# Patient Record
Sex: MALE | Race: WHITE | NOT HISPANIC OR LATINO | Employment: FULL TIME | URBAN - NONMETROPOLITAN AREA
[De-identification: names, ages, dates, MRNs, and addresses within clinical notes are randomized per-mention and may not be internally consistent; named-entity substitution may affect disease eponyms.]

---

## 2017-11-30 ENCOUNTER — HISTORY (OUTPATIENT)
Dept: FAMILY MEDICINE | Facility: OTHER | Age: 42
End: 2017-11-30

## 2017-11-30 ENCOUNTER — OFFICE VISIT - GICH (OUTPATIENT)
Dept: FAMILY MEDICINE | Facility: OTHER | Age: 42
End: 2017-11-30

## 2017-11-30 DIAGNOSIS — Z00.00 ENCOUNTER FOR GENERAL ADULT MEDICAL EXAMINATION WITHOUT ABNORMAL FINDINGS: ICD-10-CM

## 2017-11-30 LAB
CHOL/HDL RATIO - HISTORICAL: 4.95
CHOLESTEROL TOTAL: 183 MG/DL
GLUCOSE SERPL-MCNC: 106 MG/DL (ref 70–105)
HDLC SERPL-MCNC: 37 MG/DL (ref 23–92)
LDLC SERPL CALC-MCNC: 133 MG/DL
NON-HDL CHOLESTEROL - HISTORICAL: 146 MG/DL
PROVIDER ORDERDED STATUS - HISTORICAL: ABNORMAL
TRIGL SERPL-MCNC: 67 MG/DL

## 2017-12-28 NOTE — PROGRESS NOTES
Patient Information     Patient Name MRN Sex Ismael Collins [id number] Male 1975      Progress Notes by Ismael Cruz MD at 2017  3:43 PM     Author:  Ismael Cruz MD Service:  (none) Author Type:  Physician     Filed:  2017 11:45 AM Encounter Date:  2017 Status:  Signed     :  Ismael Cruz MD (Physician)              SUBJECTIVE:    Ismael Maier is a 42 y.o. male who presents for px    HPI: No significant concerns at all.  Intends to stop tobacco.    PROBLEM LIST:  Patient Active Problem List     Diagnosis  Code     KNEE PAIN, RIGHT M25.569     IMPACTED CERUMEN H61.20     TICK-BORNE FEVER A93.8     PAST MEDICAL HISTORY:No past medical history on file.  SURGICAL HISTORY:  Past Surgical History:      Procedure  Laterality Date     NO PREVIOUS SURGERY         SOCIAL HISTORY:  Social History     Social History        Marital status:       Spouse name: N/A     Number of children:  N/A     Years of education:  N/A     Occupational History        [employer]     Social History Main Topics        Smoking status:  Never Smoker     Smokeless tobacco:  Current User     Types: Chew     Alcohol use  No     Drug use:  No     Sexual activity:  Not on file     Other Topics  Concern     Not on file      Social History Narrative     .  Two children.  Works as a  for [employer].     FAMILY HISTORY:No family history on file.   CURRENT MEDICATIONS:   No current outpatient prescriptions on file.     No current facility-administered medications for this visit.      Medications have been reviewed by me and are current to the best of my knowledge and ability.    ALLERGIES:  Review of patient's allergies indicates no known allergies.    REVIEW OF SYSTEMS:  General: denies any general problems.  Eyes: denies problems  Ears/Nose/Throat: denies problems  Cardiovascular: denies problems  Respiratory: denies problems  Gastrointestinal: denies problems  Genitourinary: denies  problems  Musculoskeletal: denies problems  Skin: right cheek nodule for over 5 years, has been told it is a cyst  Neurologic: denies problems  Psychiatric: denies problems  Endocrine: denies problems  Heme/Lymphatic: denies problems  Allergic/Immunologic: denies problems  PHQ Depression Screening 11/30/2017   Date of PHQ exam (doc flow) 11/30/2017   1. Lack of interest/pleasure 0 - Not at all   2. Feeling down/depressed 0 - Not at all   PHQ-2 TOTAL SCORE 0   Some recent data might be hidden       OBJECTIVE:  /64  Pulse 62  Resp 16  Ht 1.829 m (6')  Wt 94.5 kg (208 lb 6.4 oz)  BMI 28.26 kg/m2  EXAM:   General Appearance: Pleasant, alert, appropriate appearance for age. No acute distress  Head Exam: Normal. Normocephalic, atraumatic.  Eye Exam:  Normal external eye, conjunctiva, lids, cornea. AISSATOU.  Ear Exam: Normal TM's bilaterally. Normal auditory canals and external ears. Non-tender.  Nose Exam: Normal external nose, mucus membranes, and septum.  OroPharynx Exam:  Dental hygiene adequate. Normal buccal mucosa. Normal pharynx.  Neck Exam:  Supple, no masses or nodes.  Thyroid Exam: No nodules or enlargement.  Chest/Respiratory Exam: Normal chest wall and respirations. Clear to auscultation.  Breast Exam: No dimpling, nipple retraction or discharge. No masses or nodes.  Cardiovascular Exam: Regular rate and rhythm. S1, S2, no murmur, click, gallop, or rubs.  Gastrointestinal Exam: Soft, non-tender, no masses or organomegaly.  Genitourinary Exam Male: Normal male genitalia. No discharge or penile ulcerations. No testicular masses or swelling.  Lymphatic Exam: Non-palpable nodes in neck, clavicular, axillary, or inguinal regions.  Musculoskeletal Exam: Back is straight and non-tender, full ROM of upper and lower extremities.  Skin: right cheek with a 1.5 cm nodule, consistent with a sebaceous cyst  Neurologic Exam: Nonfocal, symmetric DTRs, normal gross motor, tone coordination and no tremor.  Psychiatric  Exam: Alert and oriented - appropriate affect.    ASSESSMENT/PLAN:    ICD-10-CM    1. Routine medical exam Z00.00 LIPID PANEL      GLUCOSE, FASTING     BP Readings from Last 1 Encounters:11/30/17 : 124/64  Mr. Austin blood pressure is out of the normal range for adults. Per JNC-8 guidelines normal adult blood pressure is < 120/80, pre-hypertensive is between 120/80 and 139/89, and hypertension is 140/90 or greater. Risks of hypertension were discussed. Patient's strategy will be to recheck blood pressure in 12 months    Can come back for procedure appointment for cyst removal.    Ismael Cruz MD ....................  11/30/2017   3:56 PM

## 2017-12-30 NOTE — NURSING NOTE
Patient Information     Patient Name MRN Sex Ismael West [id number] Male 1975      Nursing Note by Day Fraser at 2017  3:30 PM     Author:  Day Fraser Service:  (none) Author Type:  (none)     Filed:  2017  3:42 PM Encounter Date:  2017 Status:  Signed     :  Day Fraser            Coming in for a physical for health care screening, check lump on the check  Day Fraser ....................  2017   3:30 PM

## 2018-01-26 VITALS
DIASTOLIC BLOOD PRESSURE: 64 MMHG | RESPIRATION RATE: 16 BRPM | SYSTOLIC BLOOD PRESSURE: 124 MMHG | HEART RATE: 62 BPM | BODY MASS INDEX: 28.23 KG/M2 | HEIGHT: 72 IN | WEIGHT: 208.4 LBS

## 2018-01-31 ENCOUNTER — DOCUMENTATION ONLY (OUTPATIENT)
Dept: FAMILY MEDICINE | Facility: OTHER | Age: 43
End: 2018-01-31

## 2018-07-23 NOTE — PROGRESS NOTES
Patient Information     Patient Name  Ismael Maier MRN  [id number] Sex  Male   1975      Letter by Ismael Cruz MD at      Author:  Ismael Cruz MD Service:  (none) Author Type:  (none)    Filed:   Encounter Date:  2017 Status:  (Other)           Ismael Maier  [address]  [place] [address]          2017    Dear Mr. Maier:    Following are the tests completed during your last clinic visit.  The results of these tests are normal and require no further attention unless otherwise noted.    Results for orders placed or performed in visit on 17      LIPID PANEL      Result  Value Ref Range    CHOLESTEROL,TOTAL 183 <200 mg/dL    TRIGLYCERIDES 67 <150 mg/dL    HDL CHOLESTEROL 37 23 - 92 mg/dL    NON-HDL CHOLESTEROL 146 (H) <145 mg/dl    CHOL/HDL RATIO            4.95 (H) <4.50                    LDL CHOLESTEROL 133 (H) <100 mg/dL    PROVIDER ORDERED STATUS RANDOM    GLUCOSE, FASTING      Result  Value Ref Range    GLUCOSE 106 (H) 70 - 105 mg/dL         If you have any further questions or problems contact my office at  [phone].    Thank you,    Ismael Cruz MD

## 2018-11-02 ENCOUNTER — OFFICE VISIT (OUTPATIENT)
Dept: FAMILY MEDICINE | Facility: OTHER | Age: 43
End: 2018-11-02
Attending: FAMILY MEDICINE
Payer: COMMERCIAL

## 2018-11-02 VITALS
HEIGHT: 72 IN | BODY MASS INDEX: 28.53 KG/M2 | WEIGHT: 210.6 LBS | RESPIRATION RATE: 14 BRPM | SYSTOLIC BLOOD PRESSURE: 126 MMHG | DIASTOLIC BLOOD PRESSURE: 64 MMHG | HEART RATE: 62 BPM

## 2018-11-02 DIAGNOSIS — L72.3 SEBACEOUS CYST: ICD-10-CM

## 2018-11-02 DIAGNOSIS — Z00.00 ROUTINE GENERAL MEDICAL EXAMINATION AT A HEALTH CARE FACILITY: Primary | ICD-10-CM

## 2018-11-02 DIAGNOSIS — Z13.220 SCREENING FOR LIPID DISORDERS: ICD-10-CM

## 2018-11-02 LAB
CHOLEST SERPL-MCNC: 182 MG/DL
GLUCOSE SERPL-MCNC: 88 MG/DL (ref 70–105)
HDLC SERPL-MCNC: 33 MG/DL (ref 23–92)
LDLC SERPL CALC-MCNC: 136 MG/DL
NONHDLC SERPL-MCNC: 149 MG/DL
TRIGL SERPL-MCNC: 65 MG/DL

## 2018-11-02 PROCEDURE — 36415 COLL VENOUS BLD VENIPUNCTURE: CPT | Performed by: FAMILY MEDICINE

## 2018-11-02 PROCEDURE — 99396 PREV VISIT EST AGE 40-64: CPT | Performed by: FAMILY MEDICINE

## 2018-11-02 PROCEDURE — 82947 ASSAY GLUCOSE BLOOD QUANT: CPT | Performed by: FAMILY MEDICINE

## 2018-11-02 PROCEDURE — 80061 LIPID PANEL: CPT | Performed by: FAMILY MEDICINE

## 2018-11-02 ASSESSMENT — ANXIETY QUESTIONNAIRES
3. WORRYING TOO MUCH ABOUT DIFFERENT THINGS: NOT AT ALL
1. FEELING NERVOUS, ANXIOUS, OR ON EDGE: NOT AT ALL
6. BECOMING EASILY ANNOYED OR IRRITABLE: NOT AT ALL
7. FEELING AFRAID AS IF SOMETHING AWFUL MIGHT HAPPEN: NOT AT ALL
5. BEING SO RESTLESS THAT IT IS HARD TO SIT STILL: NOT AT ALL
IF YOU CHECKED OFF ANY PROBLEMS ON THIS QUESTIONNAIRE, HOW DIFFICULT HAVE THESE PROBLEMS MADE IT FOR YOU TO DO YOUR WORK, TAKE CARE OF THINGS AT HOME, OR GET ALONG WITH OTHER PEOPLE: NOT DIFFICULT AT ALL
2. NOT BEING ABLE TO STOP OR CONTROL WORRYING: NOT AT ALL
GAD7 TOTAL SCORE: 0

## 2018-11-02 ASSESSMENT — PAIN SCALES - GENERAL: PAINLEVEL: NO PAIN (0)

## 2018-11-02 ASSESSMENT — PATIENT HEALTH QUESTIONNAIRE - PHQ9: 5. POOR APPETITE OR OVEREATING: NOT AT ALL

## 2018-11-02 NOTE — LETTER
November 2, 2018      Ismael Maier  [address]  [place] [address]        Dear Ismael,     This is all good.    Results for orders placed or performed in visit on 11/02/18   Lipid Panel   Result Value Ref Range    Cholesterol 182 <200 mg/dL    Triglycerides 65 <150 mg/dL    HDL Cholesterol 33 23 - 92 mg/dL    LDL Cholesterol Calculated 136 (H) <100 mg/dL    Non HDL Cholesterol 149 (H) <130 mg/dL   Glucose   Result Value Ref Range    Glucose 88 70 - 105 mg/dL           Sincerely,        Ismael Cruz MD

## 2018-11-02 NOTE — PROGRESS NOTES
SUBJECTIVE:   CC: Ismael Maier is an 42 year old male who presents for preventative health visit.   Has a small lump on the side of his right cheek that he wants to get checked out again. Has had this checked in the past, somewhat more bothersome with shaving.     Healthy Habits:    Do you get at least three servings of calcium containing foods daily (dairy, green leafy vegetables, etc.)? yes    Amount of exercise or daily activities, outside of work: 5 day(s) per week    Problems taking medications regularly No    Medication side effects: No    Have you had an eye exam in the past two years? no    Do you see a dentist twice per year? yes    Do you have sleep apnea, excessive snoring or daytime drowsiness?no     The 10-year ASCVD risk score (Deirdre WINTERS Jr, et al., 2013) is: 1.8%    Values used to calculate the score:      Age: 42 years      Sex: Male      Is Non- : No      Diabetic: No      Tobacco smoker: No      Systolic Blood Pressure: 126 mmHg      Is BP treated: No      HDL Cholesterol: 37 mg/dL      Total Cholesterol: 183 mg/dL    -------------------------------------    Today's PHQ-2 Score:   PHQ-2 ( 1999 [employer]) 11/2/2018   Q1: Little interest or pleasure in doing things 0   Q2: Feeling down, depressed or hopeless 0   PHQ-2 Score 0       Abuse: Current or Past(Physical, Sexual or Emotional)- No  Do you feel safe in your environment - No    Social History   Substance Use Topics     Smoking status: Never Smoker     Smokeless tobacco: Current User     Types: Chew     Alcohol use No      If you drink alcohol do you typically have >3 drinks per day or >7 drinks per week? No                      Last PSA: No results found for: PSA    Reviewed orders with patient. Reviewed health maintenance and updated orders accordingly - Yes  Labs reviewed in EPIC    Reviewed and updated as needed this visit by clinical staff  Tobacco  Allergies  Meds  Med Hx  Surg Hx  Fam Hx  Soc Hx     "    Reviewed and updated as needed this visit by Provider        History reviewed. No pertinent past medical history.   Past Surgical History:   Procedure Laterality Date     OTHER SURGICAL HISTORY      [id number],NO PREVIOUS SURGERY       ROS:  CONSTITUTIONAL: NEGATIVE for fever, chills, change in weight  INTEGUMENTARY/SKIN: NEGATIVE for worrisome rashes, moles or lesions  EYES: NEGATIVE for vision changes or irritation  ENT: NEGATIVE for ear, mouth and throat problems  RESP: NEGATIVE for significant cough or SOB  CV: NEGATIVE for chest pain, palpitations or peripheral edema  GI: NEGATIVE for nausea, abdominal pain, heartburn, or change in bowel habits   male: negative for dysuria, hematuria, decreased urinary stream, erectile dysfunction, urethral discharge  MUSCULOSKELETAL: NEGATIVE for significant arthralgias or myalgia  NEURO: NEGATIVE for weakness, dizziness or paresthesias  PSYCHIATRIC: NEGATIVE for changes in mood or affect    OBJECTIVE:   /64 (BP Location: Right arm, Patient Position: Sitting, Cuff Size: Adult Regular)  Pulse 62  Resp 14  Ht 6' 0.25\" (1.835 m)  Wt 210 lb 9.6 oz (95.5 kg)  BMI 28.37 kg/m2  EXAM:  GENERAL: healthy, alert and no distress  HEENT: 1 cm x 1 cm round, mobile, non tender cystic lesion in the right buccal area.  NECK: no adenopathy, no asymmetry, masses, or scars and thyroid normal to palpation  RESP: lungs clear to auscultation - no rales, rhonchi or wheezes  CV: regular rate and rhythm, normal S1 S2, no S3 or S4, no murmur, click or rub, no peripheral edema and peripheral pulses strong  ABDOMEN: soft, nontender, no hepatosplenomegaly, no masses and bowel sounds normal  MS: no gross musculoskeletal defects noted, no edema    Diagnostic Test Results:  No results found for this or any previous visit (from the past 24 hour(s)).    ASSESSMENT/PLAN:   (Z00.00) Routine general medical examination at a health care facility  (primary encounter diagnosis)  Stable, no " "complaints    (Z13.220) Screening for lipid disorders  Lipid panel last year showed slightly elevated LDL (133) and glucose (106). Repeat labs today. ASCVD risk 1.8%, statin therapy not indicated as of now. Continue with lifestyle modifications.   Plan: Lipid Profile, Lipid Panel, Glucose    (L72.3) Sebaceous cyst  Benign cystic lesion, not increasing in size. Getting more bothersome with shaving, would like to get this removed. Referral for general surgery.   Plan: GENERAL SURG ADULT REFERRAL    COUNSELING:  Reviewed preventive health counseling, as reflected in patient instructions       Regular exercise       Healthy diet/nutrition    BP Readings from Last 1 Encounters:   11/02/18 126/64     Estimated body mass index is 28.37 kg/(m^2) as calculated from the following:    Height as of this encounter: 6' 0.25\" (1.835 m).    Weight as of this encounter: 210 lb 9.6 oz (95.5 kg).           reports that he has never smoked. His smokeless tobacco use includes Chew.      Counseling Resources:  ATP IV Guidelines  Pooled Cohorts Equation Calculator  FRAX Risk Assessment  ICSI Preventive Guidelines  Dietary Guidelines for Americans, 2010  USDA's MyPlate  ASA Prophylaxis  Lung CA Screening    Eriberto CADENA    Pt was seen by me as well as Eriberto York MS3.  I was present for the exam and medical decision making, and I confirmed the complete history.    Ismael Cruz MD on 11/2/2018 at 12:25 PM    "

## 2018-11-02 NOTE — MR AVS SNAPSHOT
After Visit Summary   11/2/2018    Ismael Maier    MRN: [id number]           Patient Information     Date Of Birth          1975        Visit Information        Provider Department      11/2/2018 8:15 AM Ismael Cruz MD [facility]        Today's Diagnoses     Routine general medical examination at a health care facility    -  1    Screening for lipid disorders        Sebaceous cyst          Care Instructions      Preventive Health Recommendations  Male Ages 40 to 49    Yearly exam:             See your health care provider every year in order to  o   Review health changes.   o   Discuss preventive care.    o   Review your medicines if your doctor has prescribed any.    You should be tested each year for STDs (sexually transmitted diseases) if you re at risk.     Have a cholesterol test every 5 years.     Have a colonoscopy (test for colon cancer) if someone in your family has had colon cancer or polyps before age 50.     After age 45, have a diabetes test (fasting glucose). If you are at risk for diabetes, you should have this test every 3 years.      Talk with your health care provider about whether or not a prostate cancer screening test (PSA) is right for you.    Shots: Get a flu shot each year. Get a tetanus shot every 10 years.     Nutrition:    Eat at least 5 servings of fruits and vegetables daily.     Eat whole-grain bread, whole-wheat pasta and brown rice instead of white grains and rice.     Get adequate Calcium and Vitamin D.     Lifestyle    Exercise for at least 150 minutes a week (30 minutes a day, 5 days a week). This will help you control your weight and prevent disease.     Limit alcohol to one drink per day.     No smoking.     Wear sunscreen to prevent skin cancer.     See your dentist every six months for an exam and cleaning.              Follow-ups after your visit        Additional Services     GENERAL SURG ADULT REFERRAL       Your provider has  referred you to: [place]: [facility] [phone]   [email or web address]    Please be aware that coverage of these services is subject to the terms and limitations of your health insurance plan.  Call member services at your health plan with any benefit or coverage questions.      Please bring the following with you to your appointment:    (1) Any X-Rays, CTs or MRIs which have been performed.  Contact the facility where they were done to arrange for  prior to your scheduled appointment.   (2) List of current medications   (3) This referral request   (4) Any documents/labs given to you for this referral                  Your next 10 appointments already scheduled     Nov 21, 2018  8:50 AM CST   PROCEDURE with Nilesh Howard MD, [facility] SURGERY   [facility]    [address]  [place], [place] [facility] [id number] [phone]              Future tests that were ordered for you today     Open Future Orders        Priority Expected Expires Ordered    Lipid Profile Routine  11/2/2019 11/2/2018            Who to contact     If you have questions or need follow up information about today's clinic visit or your schedule please contact [facility] directly at [phone].  Normal or non-critical lab and imaging results will be communicated to you by MyChart, letter or phone within 4 business days after the clinic has received the results. If you do not hear from us within 7 days, please contact the clinic through MyChart or phone. If you have a critical or abnormal lab result, we will notify you by phone as soon as possible.  Submit refill requests through [employer] or call your pharmacy and they will forward the refill request to us. Please allow 3 business days for your refill to be completed.          Additional Information About Your Visit        [employer] Information     [employer] lets you  "send messages to your doctor, view your test results, renew your prescriptions, schedule appointments and more. To sign up, go to [email or web address] . Click on \"Log in\" on the left side of the screen, which will take you to the Welcome page. Then click on \"Sign up Now\" on the right side of the page.     You will be asked to enter the access code listed below, as well as some personal information. Please follow the directions to create your username and password.     Your access code is: [email or web address]  Expires: 2019 12:25 PM     Your access code will  in 90 days. If you need help or a new code, please call your [place] clinic or [phone].        Care EveryWhere ID     This is your Care EveryWhere ID. This could be used by other organizations to access your [place] medical records  [id number]        Your Vitals Were     Pulse Respirations Height BMI (Body Mass Index)          62 14 6' 0.25\" (1.835 m) 28.37 kg/m2         Blood Pressure from Last 3 Encounters:   18 126/64   17 124/64   14 114/72    Weight from Last 3 Encounters:   18 210 lb 9.6 oz (95.5 kg)   17 208 lb 6.4 oz (94.5 kg)   14 198 lb 9.6 oz (90.1 kg)              We Performed the Following     GENERAL SURG ADULT REFERRAL     Glucose     Lipid Panel        Primary Care Provider Office Phone # Fax #    Ismael Cruz [phone]       [address] [facility] [address]        Equal Access to Services     [facility] HospitalLEYLA AH: Hadii vidal Ryan, ishaan kelly, justin orozco. So [facility] [phone].    ATENCIÓN: Si habla español, tiene a brown disposición servicios gratuitos de asistencia lingüística. Brittany al [phone].    We comply with applicable federal civil rights laws and [place] laws. We do not discriminate on the basis of race, color, national origin, age, disability, sex, sexual orientation, or gender " identity.            Thank you!     Thank you for choosing [facility] AND [facility]  for your care. Our goal is always to provide you with excellent care. Hearing back from our patients is one way we can continue to improve our services. Please take a few minutes to complete the written survey that you may receive in the mail after your visit with us. Thank you!             Your Updated Medication List - Protect others around you: Learn how to safely use, store and throw away your medicines at www.disposemymeds.org.      Notice  As of 11/2/2018 12:25 PM    You have not been prescribed any medications.

## 2018-11-03 ASSESSMENT — ANXIETY QUESTIONNAIRES: GAD7 TOTAL SCORE: 0

## 2018-11-21 ENCOUNTER — OFFICE VISIT (OUTPATIENT)
Dept: SURGERY | Facility: OTHER | Age: 43
End: 2018-11-21
Attending: FAMILY MEDICINE
Payer: COMMERCIAL

## 2018-11-21 VITALS
SYSTOLIC BLOOD PRESSURE: 100 MMHG | BODY MASS INDEX: 28.58 KG/M2 | HEIGHT: 72 IN | DIASTOLIC BLOOD PRESSURE: 68 MMHG | WEIGHT: 211 LBS

## 2018-11-21 DIAGNOSIS — L72.3 SEBACEOUS CYST: Primary | ICD-10-CM

## 2018-11-21 PROCEDURE — 12052 INTMD RPR FACE/MM 2.6-5.0 CM: CPT | Performed by: SURGERY

## 2018-11-21 PROCEDURE — 88304 TISSUE EXAM BY PATHOLOGIST: CPT | Performed by: SURGERY

## 2018-11-21 PROCEDURE — 11443 EXC FACE-MM B9+MARG 2.1-3 CM: CPT | Performed by: SURGERY

## 2018-11-21 ASSESSMENT — PAIN SCALES - GENERAL: PAINLEVEL: NO PAIN (0)

## 2018-11-21 NOTE — NURSING NOTE
Chief Complaint   Patient presents with     Derm Problem     lump on right cheek       Initial /68 (BP Location: Left arm, Patient Position: Sitting, Cuff Size: Adult Large)  Ht 6' (1.829 m)  Wt 211 lb (95.7 kg)  BMI 28.62 kg/m2 Estimated body mass index is 28.62 kg/(m^2) as calculated from the following:    Height as of this encounter: 6' (1.829 m).    Weight as of this encounter: 211 lb (95.7 kg).  Medication Reconciliation: complete    Viry Aleman LPN       TIMEOUT  Universal Protocol    A. Pre-procedure verification complete     1-relevant information / documentation available, reviewed and properly matched to the patient; 2-consent accurate and complete, 3-equipment and supplies available    B. Site marking complete     Site marked if not in continuous attendance with patient    C. TIME OUT completed     Time Out was conducted just prior to starting procedure to verify the eight required elements: 1-patient identity, 2-consent accurate and complete, 3-position, 4-correct side/site marked (if applicable), 5-procedure, 6-relevant images / results properly labeled and displayed (if applicable), 7-antibiotics / irrigation fluids (if applicable), 8-safety precautions.

## 2018-11-21 NOTE — PROGRESS NOTES
Procedure Note  Pre/Post Operative Diagnosis:   Subcutaneous cyst    Procedure:    Excision of subcutaneous cyst    Surgeon: Nilesh Howard MD    Local Anesthesia: 1% lidocaine with 0.25%Marcaine with epinephrine    Indication for the procedure:    This is a 43 year old male patient referred by DARIEL Cruz with subcutaneous cyst.  Patient has had this is for many years he denies ever getting inflamed, infected, or having any drainage.  Patient had a cyst removed from his left axilla when he was a kid.  After explaining the risks to include bleeding, infection, recurrence or need for reexcision,and scarring the patient wished to proceed.    Procedure:   The area was prepped and draped in usual sterile fashion with ChloraPrep . After, adequate local anesthesia, a 2.2cm skin incision was made directly over the lesion. The cyst, 0.8cm in greatest dimension, was easily shelled out of the subcutaneous tissues and delivered intact. The subcutaneous tissues were reapproximated with 3-0 Vicryl inverted interrupted sutures.  Skin was closed with running 5-0 Monocryl suture. A clean dry dressing was applied.    Plan:  Patient will follow up if there any problems with the wound including redness or drainage.      GUEVARA Howard MD

## 2018-11-21 NOTE — MR AVS SNAPSHOT
"              After Visit Summary   11/21/2018    Ismael Maier    MRN: [id number]           Patient Information     Date Of Birth          1975        Visit Information        Provider Department      11/21/2018 8:50 AM Nilesh Howard MD; [facility] 536 SURGERY [facility]        Today's Diagnoses     Sebaceous cyst          Care Instructions    Your incision was closed with stitches that will dissolve.    It is ok to  and get the incision wet in the shower on the day after your procedure.     Don't soak in a tub, pool or lake for 5 days.  If you have concerns, please call.  Call with increased redness, drainage or fever.          Follow-ups after your visit        Who to contact     If you have questions or need follow up information about today's clinic visit or your schedule please contact [facility] directly at [phone].  Normal or non-critical lab and imaging results will be communicated to you by MyChart, letter or phone within 4 business days after the clinic has received the results. If you do not hear from us within 7 days, please contact the clinic through [employer] or phone. If you have a critical or abnormal lab result, we will notify you by phone as soon as possible.  Submit refill requests through Pocketbook or call your pharmacy and they will forward the refill request to us. Please allow 3 business days for your refill to be completed.          Additional Information About Your Visit        MyChart Information     Pocketbook lets you send messages to your doctor, view your test results, renew your prescriptions, schedule appointments and more. To sign up, go to [email or web address] . Click on \"Log in\" on the left side of the screen, which will take you to the Welcome page. Then click on \"Sign up Now\" on the right side of the page.     You will be asked to enter the access code listed below, as well as some personal information. Please follow " the directions to create your username and password.     Your access code is: [email or web address]  Expires: 2019 11:25 AM     Your access code will  in 90 days. If you need help or a new code, please call your [facility] clinic or [phone].        Care EveryWhere ID     This is your Care EveryWhere ID. This could be used by other organizations to access your [facility] medical records  [id number]        Your Vitals Were     Height BMI (Body Mass Index)                6' (1.829 m) 28.62 kg/m2           Blood Pressure from Last 3 Encounters:   18 100/68   18 126/64   17 124/64    Weight from Last 3 Encounters:   18 211 lb (95.7 kg)   18 210 lb 9.6 oz (95.5 kg)   17 208 lb 6.4 oz (94.5 kg)              Today, you had the following     No orders found for display       Primary Care Provider Office Phone # Fax #    Ismael Cruz [phone]       [address] [facility] [address]        Equal Access to Services     [facility] AH: Hadii aad ku hadasho Sofrancescoali, waaxda luqadaha, qaybta kaalmada adeegyada, justin carreon. So [facility] [phone].    ATENCIÓN: Si habla español, tiene a brown disposición servicios gratuitos de asistencia lingüística. Llame al [phone].    We comply with applicable federal civil rights laws and [place] laws. We do not discriminate on the basis of race, color, national origin, age, disability, sex, sexual orientation, or gender identity.            Thank you!     Thank you for choosing [facility] AND [facility]  for your care. Our goal is always to provide you with excellent care. Hearing back from our patients is one way we can continue to improve our services. Please take a few minutes to complete the written survey that you may receive in the mail after your visit with us. Thank you!             Your Updated Medication List - Protect others around you: Learn how to safely use, store and  throw away your medicines at www.disposemymeds.org.      Notice  As of 11/21/2018  9:14 AM    You have not been prescribed any medications.

## 2018-11-21 NOTE — NURSING NOTE
Chief Complaint   Patient presents with     Derm Problem     lump on right cheek       Initial /68 (BP Location: Left arm, Patient Position: Sitting, Cuff Size: Adult Large)  Ht 6' (1.829 m)  Wt 211 lb (95.7 kg)  BMI 28.62 kg/m2 Estimated body mass index is 28.62 kg/(m^2) as calculated from the following:    Height as of this encounter: 6' (1.829 m).    Weight as of this encounter: 211 lb (95.7 kg).  Medication Reconciliation: complete    Viry Aleman LPN

## 2018-11-21 NOTE — PATIENT INSTRUCTIONS
Your incision was closed with stitches that will dissolve.    It is ok to  and get the incision wet in the shower on the day after your procedure.     Don't soak in a tub, pool or lake for 5 days.  If you have concerns, please call.  Call with increased redness, drainage or fever.

## 2019-02-26 ENCOUNTER — OFFICE VISIT (OUTPATIENT)
Dept: FAMILY MEDICINE | Facility: OTHER | Age: 44
End: 2019-02-26
Attending: FAMILY MEDICINE
Payer: COMMERCIAL

## 2019-02-26 VITALS
WEIGHT: 216 LBS | BODY MASS INDEX: 29.29 KG/M2 | RESPIRATION RATE: 14 BRPM | DIASTOLIC BLOOD PRESSURE: 62 MMHG | SYSTOLIC BLOOD PRESSURE: 106 MMHG | TEMPERATURE: 98.6 F | HEART RATE: 58 BPM

## 2019-02-26 DIAGNOSIS — H60.392 INFECTIVE OTITIS EXTERNA, LEFT: Primary | ICD-10-CM

## 2019-02-26 PROCEDURE — 99213 OFFICE O/P EST LOW 20 MIN: CPT | Performed by: FAMILY MEDICINE

## 2019-02-26 RX ORDER — CEPHALEXIN 500 MG/1
500 CAPSULE ORAL 3 TIMES DAILY
Qty: 30 CAPSULE | Refills: 0 | Status: SHIPPED | OUTPATIENT
Start: 2019-02-26 | End: 2019-03-08

## 2019-02-26 RX ORDER — NEOMYCIN SULFATE, POLYMYXIN B SULFATE, HYDROCORTISONE 3.5; 10000; 1 MG/ML; [USP'U]/ML; MG/ML
3 SOLUTION/ DROPS AURICULAR (OTIC) 4 TIMES DAILY
Qty: 5 ML | Refills: 3 | Status: SHIPPED | OUTPATIENT
Start: 2019-02-26 | End: 2019-03-05

## 2019-02-26 ASSESSMENT — PAIN SCALES - GENERAL: PAINLEVEL: NO PAIN (0)

## 2019-02-26 ASSESSMENT — ANXIETY QUESTIONNAIRES
1. FEELING NERVOUS, ANXIOUS, OR ON EDGE: NOT AT ALL
2. NOT BEING ABLE TO STOP OR CONTROL WORRYING: NOT AT ALL
IF YOU CHECKED OFF ANY PROBLEMS ON THIS QUESTIONNAIRE, HOW DIFFICULT HAVE THESE PROBLEMS MADE IT FOR YOU TO DO YOUR WORK, TAKE CARE OF THINGS AT HOME, OR GET ALONG WITH OTHER PEOPLE: NOT DIFFICULT AT ALL
3. WORRYING TOO MUCH ABOUT DIFFERENT THINGS: NOT AT ALL
6. BECOMING EASILY ANNOYED OR IRRITABLE: NOT AT ALL
5. BEING SO RESTLESS THAT IT IS HARD TO SIT STILL: NOT AT ALL
GAD7 TOTAL SCORE: 0
7. FEELING AFRAID AS IF SOMETHING AWFUL MIGHT HAPPEN: NOT AT ALL

## 2019-02-26 ASSESSMENT — ENCOUNTER SYMPTOMS
FEVER: 0
COUGH: 0

## 2019-02-26 ASSESSMENT — PATIENT HEALTH QUESTIONNAIRE - PHQ9: 5. POOR APPETITE OR OVEREATING: NOT AT ALL

## 2019-02-26 NOTE — PROGRESS NOTES
SUBJECTIVE:   Ismael Maier is a 43 year old male who presents to clinic today for the following health issues:    HPI  Ear issues.  Left side decreased hearing for about 10 days.  Some swelling about 4 days ago.  Recent swimming, but symptoms before the swimming.  No fevers.  Was on a recent airplane as well.  On a cruise, with pain into the angle of the jaw.    Patient Active Problem List    Diagnosis Date Noted     Pain in joint, lower leg 11/09/2011     Priority: Medium     Tick-borne fever 07/25/2011     Priority: Medium     Impacted cerumen 03/04/2011     Priority: Medium     Past Surgical History:   Procedure Laterality Date     OTHER SURGICAL HISTORY      UIK243,NO PREVIOUS SURGERY     Social History     Tobacco Use     Smoking status: Never Smoker     Smokeless tobacco: Former User     Types: Chew   Substance Use Topics     Alcohol use: No     No current outpatient medications on file.     No Known Allergies    Review of Systems   Constitutional: Negative for fever.   HENT: Positive for ear pain and hearing loss.    Respiratory: Negative for cough.         OBJECTIVE:     /62 (BP Location: Right arm, Patient Position: Sitting, Cuff Size: Adult Large)   Pulse 58   Temp 98.6  F (37  C) (Tympanic)   Resp 14   Wt 98 kg (216 lb)   BMI 29.29 kg/m    Body mass index is 29.29 kg/m .  Physical Exam   Constitutional: He appears well-developed. No distress.   HENT:   Head: Normocephalic and atraumatic.   Left canal occluded from edema.  Mild pain and minimal erythema.  Spec can be inserted about 1/2 way, cannot see tympanic membrane.  Right is normal.    Eyes: EOM are normal. Pupils are equal, round, and reactive to light.   Skin: He is not diaphoretic.       Diagnostic Test Results:  none     ASSESSMENT/PLAN:         (H60.392) Infective otitis externa, left  (primary encounter diagnosis)  Comment: given symptoms are moderate and I am not sure the drops will fully penetrate, will also treat with oral  antibiotics.  Follow up closely.  Plan: neomycin-polymyxin-hydrocortisone (CORTISPORIN)        [id number] otic solution, cephALEXin (KEFLEX)         500 MG capsule                 Ismael Cruz MD  [facility]

## 2019-02-26 NOTE — NURSING NOTE
Coming in to have his left ear checked, swollen shut     Chief Complaint   Patient presents with     Ear Problem     Left eawr is swollen shut, sore for about one week       Initial /62 (BP Location: Right arm, Patient Position: Sitting, Cuff Size: Adult Large)   Pulse 58   Temp 98.6  F (37  C) (Tympanic)   Resp 14   Wt 98 kg (216 lb)   BMI 29.29 kg/m   Estimated body mass index is 29.29 kg/m  as calculated from the following:    Height as of 11/21/18: 1.829 m (6').    Weight as of this encounter: 98 kg (216 lb).  Medication Reconciliation: complete    Day Fraser LPN

## 2019-02-27 ASSESSMENT — ANXIETY QUESTIONNAIRES: GAD7 TOTAL SCORE: 0

## 2019-11-21 ENCOUNTER — OFFICE VISIT (OUTPATIENT)
Dept: FAMILY MEDICINE | Facility: OTHER | Age: 44
End: 2019-11-21
Attending: FAMILY MEDICINE
Payer: COMMERCIAL

## 2019-11-21 VITALS
BODY MASS INDEX: 28.31 KG/M2 | SYSTOLIC BLOOD PRESSURE: 103 MMHG | HEIGHT: 72 IN | DIASTOLIC BLOOD PRESSURE: 69 MMHG | HEART RATE: 63 BPM | WEIGHT: 209 LBS | RESPIRATION RATE: 18 BRPM | TEMPERATURE: 97.8 F | OXYGEN SATURATION: 97 %

## 2019-11-21 DIAGNOSIS — Z13.220 SCREENING FOR LIPID DISORDERS: ICD-10-CM

## 2019-11-21 DIAGNOSIS — Z00.00 ROUTINE GENERAL MEDICAL EXAMINATION AT A HEALTH CARE FACILITY: Primary | ICD-10-CM

## 2019-11-21 LAB
CHOLEST SERPL-MCNC: 163 MG/DL
GLUCOSE SERPL-MCNC: 100 MG/DL (ref 70–105)
HBA1C MFR BLD: 5.5 % (ref 4–6)
HDLC SERPL-MCNC: 30 MG/DL (ref 23–92)
LDLC SERPL CALC-MCNC: 121 MG/DL
NONHDLC SERPL-MCNC: 133 MG/DL
TRIGL SERPL-MCNC: 60 MG/DL

## 2019-11-21 PROCEDURE — 36415 COLL VENOUS BLD VENIPUNCTURE: CPT | Mod: ZL | Performed by: FAMILY MEDICINE

## 2019-11-21 PROCEDURE — 83036 HEMOGLOBIN GLYCOSYLATED A1C: CPT | Mod: ZL | Performed by: FAMILY MEDICINE

## 2019-11-21 PROCEDURE — 99396 PREV VISIT EST AGE 40-64: CPT | Performed by: FAMILY MEDICINE

## 2019-11-21 PROCEDURE — 80061 LIPID PANEL: CPT | Mod: ZL | Performed by: FAMILY MEDICINE

## 2019-11-21 PROCEDURE — 82947 ASSAY GLUCOSE BLOOD QUANT: CPT | Mod: ZL | Performed by: FAMILY MEDICINE

## 2019-11-21 ASSESSMENT — MIFFLIN-ST. JEOR: SCORE: 1876.02

## 2019-11-21 ASSESSMENT — PAIN SCALES - GENERAL: PAINLEVEL: NO PAIN (0)

## 2019-11-21 NOTE — PROGRESS NOTES
3  SUBJECTIVE:   CC: Ismael Maier is an 44 year old male who presents for preventive health visit.     Healthy Habits:    Do you get at least three servings of calcium containing foods daily (dairy, green leafy vegetables, etc.)? yes    Amount of exercise or daily activities, outside of work: 3-4 day(s) per week    Problems taking medications regularly No    Medication side effects: No    Have you had an eye exam in the past two years? no    Do you see a dentist twice per year? no    Do you have sleep apnea, excessive snoring or daytime drowsiness?no           Today's PHQ-2 Score:   PHQ-2 ( 1999 Pfizer) 11/21/2019 2/26/2019   Q1: Little interest or pleasure in doing things 0 0   Q2: Feeling down, depressed or hopeless 0 0   PHQ-2 Score 0 0       Abuse: Current or Past(Physical, Sexual or Emotional)- No  Do you feel safe in your environment? Yes        Social History     Tobacco Use     Smoking status: Never Smoker     Smokeless tobacco: Former User     Types: Chew   Substance Use Topics     Alcohol use: No     If you drink alcohol do you typically have >3 drinks per day or >7 drinks per week? No                      Last PSA: No results found for: PSA    Reviewed orders with patient. Reviewed health maintenance and updated orders accordingly - Yes  Lab work is in process  Labs reviewed in EPIC  No current outpatient medications on file.     No Known Allergies    Reviewed and updated as needed this visit by clinical staff  Tobacco  Allergies  Meds  Med Hx  Surg Hx  Fam Hx  Soc Hx        Reviewed and updated as needed this visit by Provider        History reviewed. No pertinent past medical history.   Past Surgical History:   Procedure Laterality Date     OTHER SURGICAL HISTORY      ONS272,NO PREVIOUS SURGERY       ROS:  CONSTITUTIONAL: NEGATIVE for fever, chills, change in weight  INTEGUMENTARY/SKIN: NEGATIVE for worrisome rashes, moles or lesions  EYES: NEGATIVE for vision changes or irritation  ENT:  NEGATIVE for ear, mouth and throat problems  RESP: NEGATIVE for significant cough or SOB  CV: NEGATIVE for chest pain, palpitations or peripheral edema  GI: NEGATIVE for nausea, abdominal pain, heartburn, or change in bowel habits   male: negative for dysuria, hematuria, decreased urinary stream, erectile dysfunction, urethral discharge  MUSCULOSKELETAL: NEGATIVE for significant arthralgias or myalgia  NEURO: NEGATIVE for weakness, dizziness or paresthesias  PSYCHIATRIC: NEGATIVE for changes in mood or affect    OBJECTIVE:   /69 (BP Location: Right arm, Patient Position: Sitting, Cuff Size: Adult Large)   Pulse 63   Temp 97.8  F (36.6  C) (Oral)   Resp 18   Ht 1.829 m (6')   Wt 94.8 kg (209 lb)   SpO2 97%   BMI 28.35 kg/m    EXAM:  GENERAL: healthy, alert and no distress  EYES: Eyes grossly normal to inspection, PERRL and conjunctivae and sclerae normal  HENT: ear canals and TM's normal, nose and mouth without ulcers or lesions  NECK: no adenopathy, no asymmetry, masses, or scars and thyroid normal to palpation  RESP: lungs clear to auscultation - no rales, rhonchi or wheezes  CV: regular rate and rhythm, normal S1 S2, no S3 or S4, no murmur, click or rub, no peripheral edema and peripheral pulses strong  ABDOMEN: soft, nontender, no hepatosplenomegaly, no masses and bowel sounds normal  MS: no gross musculoskeletal defects noted, no edema  SKIN: no suspicious lesions or rashes  NEURO: Normal strength and tone, mentation intact and speech normal  PSYCH: mentation appears normal, affect normal/bright    Diagnostic Test Results:  Labs reviewed in Epic  Results for orders placed or performed in visit on 11/21/19   Hemoglobin A1c     Status: None   Result Value Ref Range    Hemoglobin A1C 5.5 4.0 - 6.0 %   Glucose     Status: None   Result Value Ref Range    Glucose 100 70 - 105 mg/dL   Lipid Panel     Status: Abnormal   Result Value Ref Range    Cholesterol 163 <200 mg/dL    Triglycerides 60 <150 mg/dL     HDL Cholesterol 30 23 - 92 mg/dL    LDL Cholesterol Calculated 121 (H) <100 mg/dL    Non HDL Cholesterol 133 (H) <130 mg/dL         ASSESSMENT/PLAN:       ICD-10-CM    1. Routine general medical examination at a health care facility Z00.00 Hemoglobin A1c     Hemoglobin A1c   2. Screening for lipid disorders Z13.220 Lipid Panel     Glucose     Glucose     Lipid Panel       COUNSELING:  Reviewed preventive health counseling, as reflected in patient instructions       Regular exercise       Healthy diet/nutrition    Estimated body mass index is 28.35 kg/m  as calculated from the following:    Height as of this encounter: 1.829 m (6').    Weight as of this encounter: 94.8 kg (209 lb).    Weight management plan none needed     reports that he has never smoked. He has quit using smokeless tobacco.  His smokeless tobacco use included chew.      Counseling Resources:  ATP IV Guidelines  Pooled Cohorts Equation Calculator  FRAX Risk Assessment  ICSI Preventive Guidelines  Dietary Guidelines for Americans, 2010  USDA's MyPlate  ASA Prophylaxis  Lung CA Screening    Ismael Cruz MD  [facility]

## 2019-11-21 NOTE — NURSING NOTE
Patient is here for annual physical.  Dana Cruz LPN .............11/21/2019     8:03 AM      Medication Reconciliation: complete    Dana Cruz LPN  11/21/2019 8:06 AM

## 2020-11-23 ENCOUNTER — OFFICE VISIT (OUTPATIENT)
Dept: FAMILY MEDICINE | Facility: OTHER | Age: 45
End: 2020-11-23
Attending: FAMILY MEDICINE
Payer: COMMERCIAL

## 2020-11-23 VITALS
WEIGHT: 217.2 LBS | DIASTOLIC BLOOD PRESSURE: 70 MMHG | HEIGHT: 72 IN | RESPIRATION RATE: 16 BRPM | HEART RATE: 56 BPM | SYSTOLIC BLOOD PRESSURE: 108 MMHG | OXYGEN SATURATION: 97 % | BODY MASS INDEX: 29.42 KG/M2 | TEMPERATURE: 97.5 F

## 2020-11-23 DIAGNOSIS — Z02.89 HEALTH EXAMINATION OF DEFINED SUBPOPULATION: Primary | ICD-10-CM

## 2020-11-23 LAB
CHOLEST SERPL-MCNC: 192 MG/DL
GLUCOSE SERPL-MCNC: 96 MG/DL (ref 70–105)
HBA1C MFR BLD: 5.3 % (ref 4–6)
HDLC SERPL-MCNC: 32 MG/DL (ref 23–92)
LDLC SERPL CALC-MCNC: 134 MG/DL
NONHDLC SERPL-MCNC: 160 MG/DL
TRIGL SERPL-MCNC: 129 MG/DL

## 2020-11-23 PROCEDURE — 82947 ASSAY GLUCOSE BLOOD QUANT: CPT | Mod: ZL | Performed by: FAMILY MEDICINE

## 2020-11-23 PROCEDURE — 36415 COLL VENOUS BLD VENIPUNCTURE: CPT | Mod: ZL | Performed by: FAMILY MEDICINE

## 2020-11-23 PROCEDURE — 83036 HEMOGLOBIN GLYCOSYLATED A1C: CPT | Mod: ZL | Performed by: FAMILY MEDICINE

## 2020-11-23 PROCEDURE — 80061 LIPID PANEL: CPT | Mod: ZL | Performed by: FAMILY MEDICINE

## 2020-11-23 PROCEDURE — 99396 PREV VISIT EST AGE 40-64: CPT | Performed by: FAMILY MEDICINE

## 2020-11-23 ASSESSMENT — ANXIETY QUESTIONNAIRES
GAD7 TOTAL SCORE: 0
7. FEELING AFRAID AS IF SOMETHING AWFUL MIGHT HAPPEN: NOT AT ALL
1. FEELING NERVOUS, ANXIOUS, OR ON EDGE: NOT AT ALL
5. BEING SO RESTLESS THAT IT IS HARD TO SIT STILL: NOT AT ALL
3. WORRYING TOO MUCH ABOUT DIFFERENT THINGS: NOT AT ALL
2. NOT BEING ABLE TO STOP OR CONTROL WORRYING: NOT AT ALL
6. BECOMING EASILY ANNOYED OR IRRITABLE: NOT AT ALL

## 2020-11-23 ASSESSMENT — MIFFLIN-ST. JEOR: SCORE: 1908.21

## 2020-11-23 ASSESSMENT — PATIENT HEALTH QUESTIONNAIRE - PHQ9
SUM OF ALL RESPONSES TO PHQ QUESTIONS 1-9: 0
5. POOR APPETITE OR OVEREATING: NOT AT ALL

## 2020-11-23 ASSESSMENT — PAIN SCALES - GENERAL: PAINLEVEL: NO PAIN (0)

## 2020-11-23 NOTE — PROGRESS NOTES
SUBJECTIVE:   Ismael Maier is a 45 year old male who presents to clinic today for the following health issues:  The 10-year ASCVD risk score (B[place] Ratonanita WINTERS Jr., et al., 2013) is: 2%    Values used to calculate the score:      Age: 45 years      Sex: Male      Is Non- : No      Diabetic: No      Tobacco smoker: No      Systolic Blood Pressure: 108 mmHg      Is BP treated: No      HDL Cholesterol: 30 mg/dL      Total Cholesterol: 163 mg/dL      Patient arrives here for physical and establish care.  He does bring with him a physician result form which includes glucose A1c and total cholesterol.  Currently does not have any complaints or concerns.  No family history of early coronary heart disease.  No history of colon cancers.    Physical establish care    There are no active problems to display for this patient.    History reviewed. No pertinent past medical history.   No Known Allergies    Review of Systems     OBJECTIVE:     /70   Pulse 56   Temp 97.5  F (36.4  C)   Resp 16   Ht 1.829 m (6')   Wt 98.5 kg (217 lb 3.2 oz)   SpO2 97%   BMI 29.46 kg/m    Body mass index is 29.46 kg/m .  Physical Exam  Constitutional:       Appearance: Normal appearance.   Cardiovascular:      Rate and Rhythm: Normal rate.   Pulmonary:      Effort: Pulmonary effort is normal.      Breath sounds: Normal breath sounds.   Abdominal:      General: Abdomen is flat.   Musculoskeletal: Normal range of motion.   Skin:     General: Skin is warm.   Neurological:      Mental Status: He is alert.         Diagnostic Test Results:  Results for orders placed or performed in visit on 11/23/20   Lipid Panel     Status: Abnormal   Result Value Ref Range    Cholesterol 192 <200 mg/dL    Triglycerides 129 <150 mg/dL    HDL Cholesterol 32 23 - 92 mg/dL    LDL Cholesterol Calculated 134 (H) <100 mg/dL    Non HDL Cholesterol 160 (H) <130 mg/dL   Hemoglobin A1c     Status: None   Result Value Ref Range    Hemoglobin A1C 5.3  4.0 - 6.0 %   Glucose     Status: None   Result Value Ref Range    Glucose 96 70 - 105 mg/dL       ASSESSMENT/PLAN:         1. Health examination of defined subpopulation  Labs satisfactory.  Forms are signed will be faxed.  - Glucose; Future  - Hemoglobin A1c; Future  - Lipid Panel; Future  - Lipid Panel  - Hemoglobin A1c  - Glucose      Scott Mcqueen MD  [facility] AND [facility]

## 2020-11-24 ASSESSMENT — ANXIETY QUESTIONNAIRES: GAD7 TOTAL SCORE: 0

## 2021-01-29 NOTE — NURSING NOTE
Patient here for physical and has a form to be filled out with lab results. No concerns today and declines flu vaccine. Medication Reconciliation: complete.    Daisha Bryan LPN  11/23/2020 8:39 AM  
done

## 2021-09-30 ENCOUNTER — OFFICE VISIT (OUTPATIENT)
Dept: FAMILY MEDICINE | Facility: OTHER | Age: 46
End: 2021-09-30
Attending: FAMILY MEDICINE
Payer: COMMERCIAL

## 2021-09-30 VITALS
HEIGHT: 72 IN | BODY MASS INDEX: 28.33 KG/M2 | RESPIRATION RATE: 16 BRPM | HEART RATE: 66 BPM | TEMPERATURE: 97.5 F | WEIGHT: 209.2 LBS | OXYGEN SATURATION: 98 % | DIASTOLIC BLOOD PRESSURE: 64 MMHG | SYSTOLIC BLOOD PRESSURE: 104 MMHG

## 2021-09-30 DIAGNOSIS — Z02.89 HEALTH EXAMINATION OF DEFINED SUBPOPULATION: Primary | ICD-10-CM

## 2021-09-30 LAB
CHOLEST SERPL-MCNC: 176 MG/DL
FASTING STATUS PATIENT QL REPORTED: ABNORMAL
FASTING STATUS PATIENT QL REPORTED: NORMAL
GLUCOSE BLD-MCNC: 93 MG/DL (ref 70–105)
HBA1C MFR BLD: 5.4 % (ref 4–6.2)
HDLC SERPL-MCNC: 34 MG/DL (ref 23–92)
LDLC SERPL CALC-MCNC: 130 MG/DL
NONHDLC SERPL-MCNC: 142 MG/DL
TRIGL SERPL-MCNC: 60 MG/DL

## 2021-09-30 PROCEDURE — 82947 ASSAY GLUCOSE BLOOD QUANT: CPT | Mod: ZL | Performed by: FAMILY MEDICINE

## 2021-09-30 PROCEDURE — 83036 HEMOGLOBIN GLYCOSYLATED A1C: CPT | Mod: ZL | Performed by: FAMILY MEDICINE

## 2021-09-30 PROCEDURE — 80061 LIPID PANEL: CPT | Mod: ZL | Performed by: FAMILY MEDICINE

## 2021-09-30 PROCEDURE — 99396 PREV VISIT EST AGE 40-64: CPT | Performed by: FAMILY MEDICINE

## 2021-09-30 PROCEDURE — 36415 COLL VENOUS BLD VENIPUNCTURE: CPT | Mod: ZL | Performed by: FAMILY MEDICINE

## 2021-09-30 ASSESSMENT — MIFFLIN-ST. JEOR: SCORE: 1871.92

## 2021-09-30 ASSESSMENT — PAIN SCALES - GENERAL: PAINLEVEL: NO PAIN (0)

## 2021-09-30 ASSESSMENT — ANXIETY QUESTIONNAIRES
7. FEELING AFRAID AS IF SOMETHING AWFUL MIGHT HAPPEN: NOT AT ALL
5. BEING SO RESTLESS THAT IT IS HARD TO SIT STILL: NOT AT ALL
GAD7 TOTAL SCORE: 0
6. BECOMING EASILY ANNOYED OR IRRITABLE: NOT AT ALL
2. NOT BEING ABLE TO STOP OR CONTROL WORRYING: NOT AT ALL
1. FEELING NERVOUS, ANXIOUS, OR ON EDGE: NOT AT ALL
3. WORRYING TOO MUCH ABOUT DIFFERENT THINGS: NOT AT ALL

## 2021-09-30 ASSESSMENT — PATIENT HEALTH QUESTIONNAIRE - PHQ9
5. POOR APPETITE OR OVEREATING: NOT AT ALL
SUM OF ALL RESPONSES TO PHQ QUESTIONS 1-9: 0

## 2021-09-30 NOTE — PROGRESS NOTES
SUBJECTIVE:   Ismael Maier is a 45 year old male who presents to clinic today for the following health issues: Physical  The 10-year ASCVD risk score (Deirdre WINTERS Jr., et al., 2013) is: 2.3%    Values used to calculate the score:      Age: 45 years      Sex: Male      Is Non- : No      Diabetic: No      Tobacco smoker: No      Systolic Blood Pressure: 104 mmHg      Is BP treated: No      HDL Cholesterol: 32 mg/dL      Total Cholesterol: 192 mg/dL      Patient arrives here for physical.  Biometric studies done.  Currently he has no complaints or concerns family history is unremarkable for cancers or early heart disease.        There are no problems to display for this patient.    No past medical history on file.   Past Surgical History:   Procedure Laterality Date     NO HISTORY OF SURGERY         Review of Systems     OBJECTIVE:     /64   Pulse 66   Temp 97.5  F (36.4  C)   Resp 16   Ht 1.829 m (6')   Wt 94.9 kg (209 lb 3.2 oz)   SpO2 98%   BMI 28.37 kg/m    Body mass index is 28.37 kg/m .  Physical Exam  HENT:      Head: Normocephalic and atraumatic.      Nose: No congestion.      Mouth/Throat:      Pharynx: No oropharyngeal exudate.   Cardiovascular:      Rate and Rhythm: Normal rate and regular rhythm.   Abdominal:      General: Abdomen is flat.   Neurological:      Mental Status: He is alert.   Psychiatric:         Mood and Affect: Mood normal.         Thought Content: Thought content normal.         Diagnostic Test Results:  Results for orders placed or performed in visit on 09/30/21 (from the past 24 hour(s))   Glucose   Result Value Ref Range    Glucose 93 70 - 105 mg/dL    Patient Fasting > 8hrs? Unknown    Lipid Panel   Result Value Ref Range    Cholesterol 176 <200 mg/dL    Triglycerides 60 <150 mg/dL    Direct Measure HDL 34 23 - 92 mg/dL    LDL Cholesterol Calculated 130 (H) <=100 mg/dL    Non HDL Cholesterol 142 (H) <130 mg/dL    Patient Fasting > 8hrs? Unknown      Narrative    Cholesterol  Desirable:  <200 mg/dL    Triglycerides  Normal:  Less than 150 mg/dL  Borderline High:  150-199 mg/dL  High:  200-499 mg/dL  Very High:  Greater than or equal to 500 mg/dL    Direct Measure HDL  Female:  Greater than or equal to 50 mg/dL   Male:  Greater than or equal to 40 mg/dL    LDL Cholesterol  Desirable:  <100mg/dL  Above Desirable:  100-129 mg/dL   Borderline High:  130-159 mg/dL   High:  160-189 mg/dL   Very High:  >= 190 mg/dL    Non HDL Cholesterol  Desirable:  130 mg/dL  Above Desirable:  130-159 mg/dL  Borderline High:  160-189 mg/dL  High:  190-219 mg/dL  Very High:  Greater than or equal to 220 mg/dL   Hemoglobin A1c   Result Value Ref Range    Hemoglobin A1C 5.4 4.0 - 6.2 %       ASSESSMENT/PLAN:         (Z02.89) Health examination of defined subpopulation  (primary encounter diagnosis)  Comment: Satisfactory.  We will send fax biometric.  To [facility].  Plan: Glucose, Lipid Panel, Hemoglobin A1c          Scott Mcqueen MD  [facility]

## 2021-09-30 NOTE — NURSING NOTE
Patient here for yearly physical and biometric screening labs for insurance. Last eye exam longer than 5 years and last dental exam winter 2020. No concerns today. Medication Reconciliation: complete.    Daisha Bryan LPN  9/30/2021 8:06 AM

## 2021-10-01 ASSESSMENT — ANXIETY QUESTIONNAIRES: GAD7 TOTAL SCORE: 0

## 2021-10-18 ENCOUNTER — TELEPHONE (OUTPATIENT)
Dept: FAMILY MEDICINE | Facility: OTHER | Age: 46
End: 2021-10-18

## 2021-10-18 NOTE — TELEPHONE ENCOUNTER
Patients wife says the paper we faxed to the insurance company was missing the exam date.  Medical records does not have this form. They only keep things for 3 weeks.  And this is not something they scan. Please call patients wife to advise.   Maritza Teixeira on 10/18/2021 at 3:09 PM

## 2021-10-18 NOTE — TELEPHONE ENCOUNTER
NO answer unable to leave a message will try again tomorrow. Daisha Bryan LPN .......................10/18/2021  5:03 PM

## 2021-10-19 NOTE — TELEPHONE ENCOUNTER
Let her know that the date of service was added and refaxed and she requested this be mailed back to her if there are any other problems. Daisha Bryan LPN .......................10/19/2021  1:12 PM

## 2022-11-01 ENCOUNTER — OFFICE VISIT (OUTPATIENT)
Dept: FAMILY MEDICINE | Facility: OTHER | Age: 47
End: 2022-11-01
Attending: FAMILY MEDICINE
Payer: COMMERCIAL

## 2022-11-01 VITALS
OXYGEN SATURATION: 99 % | RESPIRATION RATE: 18 BRPM | TEMPERATURE: 97.2 F | WEIGHT: 220 LBS | HEART RATE: 52 BPM | DIASTOLIC BLOOD PRESSURE: 68 MMHG | SYSTOLIC BLOOD PRESSURE: 102 MMHG | BODY MASS INDEX: 29.84 KG/M2

## 2022-11-01 DIAGNOSIS — Z02.89 HEALTH EXAMINATION OF DEFINED SUBPOPULATION: Primary | ICD-10-CM

## 2022-11-01 DIAGNOSIS — Z12.11 SPECIAL SCREENING FOR MALIGNANT NEOPLASMS, COLON: ICD-10-CM

## 2022-11-01 LAB
ANION GAP SERPL CALCULATED.3IONS-SCNC: 4 MMOL/L (ref 3–14)
BUN SERPL-MCNC: 26 MG/DL (ref 7–25)
CALCIUM SERPL-MCNC: 9.8 MG/DL (ref 8.6–10.3)
CHLORIDE BLD-SCNC: 107 MMOL/L (ref 98–107)
CO2 SERPL-SCNC: 29 MMOL/L (ref 21–31)
CREAT SERPL-MCNC: 1.05 MG/DL (ref 0.7–1.3)
GFR SERPL CREATININE-BSD FRML MDRD: 89 ML/MIN/1.73M2
GLUCOSE BLD-MCNC: 87 MG/DL (ref 70–105)
HOLD SPECIMEN: NORMAL
POTASSIUM BLD-SCNC: 4.9 MMOL/L (ref 3.5–5.1)
SODIUM SERPL-SCNC: 140 MMOL/L (ref 134–144)

## 2022-11-01 PROCEDURE — 99396 PREV VISIT EST AGE 40-64: CPT | Performed by: FAMILY MEDICINE

## 2022-11-01 PROCEDURE — 80048 BASIC METABOLIC PNL TOTAL CA: CPT | Mod: ZL | Performed by: FAMILY MEDICINE

## 2022-11-01 PROCEDURE — 36415 COLL VENOUS BLD VENIPUNCTURE: CPT | Mod: ZL | Performed by: FAMILY MEDICINE

## 2022-11-01 ASSESSMENT — ENCOUNTER SYMPTOMS
SORE THROAT: 0
NAUSEA: 0
DIARRHEA: 0
NERVOUS/ANXIOUS: 0
HEADACHES: 0
DYSURIA: 0
HEMATURIA: 0
JOINT SWELLING: 0
PALPITATIONS: 0
EYE PAIN: 0
FREQUENCY: 0
HEARTBURN: 0
WEAKNESS: 0
COUGH: 0
MYALGIAS: 0
FEVER: 0
ARTHRALGIAS: 0
DIZZINESS: 0
ABDOMINAL PAIN: 0
HEMATOCHEZIA: 0
PARESTHESIAS: 0
CONSTIPATION: 0
SHORTNESS OF BREATH: 0
CHILLS: 0

## 2022-11-01 ASSESSMENT — PAIN SCALES - GENERAL: PAINLEVEL: NO PAIN (0)

## 2022-11-01 NOTE — PROGRESS NOTES
SUBJECTIVE:   Ismael Maier is a 46 year old male who presents to clinic today for the following health issues:  The 10-year ASCVD risk score (Esme STINSON, et al., 2019) is: 2%    Values used to calculate the score:      Age: 46 years      Sex: Male      Is Non- : No      Diabetic: No      Tobacco smoker: No      Systolic Blood Pressure: 102 mmHg      Is BP treated: No      HDL Cholesterol: 34 mg/dL      Total Cholesterol: 176 mg/dL      Patient arrives here for preop.  He is currently on no medications.  He has no complaints.  He is in need of a Cologuard.  I discussed Cologuard versus colonoscopy.  Patient wishes Cologuard.    Healthy Habits:     Getting at least 3 servings of Calcium per day:  Yes    Bi-annual eye exam:  NO    Dental care twice a year:  NO    Sleep apnea or symptoms of sleep apnea:  Excessive snoring    Diet:  Regular (no restrictions)    Frequency of exercise:  2-3 days/week    Duration of exercise:  15-30 minutes    Taking medications regularly:  Yes    Medication side effects:  None    PHQ-2 Total Score: 0    Additional concerns today:  No        There are no problems to display for this patient.    No past medical history on file.   Past Surgical History:   Procedure Laterality Date     NO HISTORY OF SURGERY       No Known Allergies    Review of Systems     OBJECTIVE:     /68   Pulse (!) 48   Temp 97.2  F (36.2  C)   Resp 18   Wt 99.8 kg (220 lb)   SpO2 99%   BMI 29.84 kg/m    Body mass index is 29.84 kg/m .  Physical Exam  Constitutional:       Appearance: Normal appearance.   HENT:      Right Ear: Tympanic membrane normal.      Left Ear: Tympanic membrane normal.      Mouth/Throat:      Mouth: Mucous membranes are moist.   Eyes:      Pupils: Pupils are equal, round, and reactive to light.   Cardiovascular:      Rate and Rhythm: Normal rate and regular rhythm.      Pulses: Normal pulses.      Heart sounds: Normal heart sounds.   Abdominal:      General:  Abdomen is flat.      Palpations: Abdomen is soft.   Skin:     General: Skin is warm.   Neurological:      Mental Status: He is alert.   Psychiatric:         Mood and Affect: Mood normal.       Results for orders placed or performed in visit on 11/01/22   Basic Metabolic Panel     Status: Abnormal   Result Value Ref Range    Sodium 140 134 - 144 mmol/L    Potassium 4.9 3.5 - 5.1 mmol/L    Chloride 107 98 - 107 mmol/L    Carbon Dioxide (CO2) 29 21 - 31 mmol/L    Anion Gap 4 3 - 14 mmol/L    Urea Nitrogen 26 (H) 7 - 25 mg/dL    Creatinine 1.05 0.70 - 1.30 mg/dL    Calcium 9.8 8.6 - 10.3 mg/dL    Glucose 87 70 - 105 mg/dL    GFR Estimate 89 >60 mL/min/1.73m2   Extra Tube     Status: None    Narrative    The following orders were created for panel order Extra Tube.  Procedure                               Abnormality         Status                     ---------                               -----------         ------                     Extra Serum Separator Tu...[[id number]]                      Final result                 Please view results for these tests on the individual orders.   Extra Serum Separator Tube (SST)     Status: None   Result Value Ref Range    Hold Specimen JI            ASSESSMENT/PLAN:         (Z02.89) Health examination of defined subpopulation  (primary encounter diagnosis)  Comment:   Plan: Basic Metabolic Panel     Satisfactory exam    (Z12.11) Special screening for malignant neoplasms, colon  Comment: Proceed with colon cancer screening  Plan: ROBERTH([employer])              Scott Mcqueen MD  [facility]

## 2022-11-01 NOTE — NURSING NOTE
Patient here for yearly physical, last eye exam unsure longer than 5 years and last dental exam 2020.Medication Reconciliation: complete.    Daisha Bryan LPN  11/1/2022 10:35 AM

## 2022-11-23 LAB — NONINV COLON CA DNA+OCC BLD SCRN STL QL: NEGATIVE

## 2023-11-07 ENCOUNTER — OFFICE VISIT (OUTPATIENT)
Dept: FAMILY MEDICINE | Facility: OTHER | Age: 48
End: 2023-11-07
Attending: FAMILY MEDICINE
Payer: COMMERCIAL

## 2023-11-07 VITALS
RESPIRATION RATE: 20 BRPM | SYSTOLIC BLOOD PRESSURE: 110 MMHG | BODY MASS INDEX: 30.02 KG/M2 | WEIGHT: 221.6 LBS | DIASTOLIC BLOOD PRESSURE: 64 MMHG | TEMPERATURE: 98.2 F | OXYGEN SATURATION: 97 % | HEART RATE: 83 BPM | HEIGHT: 72 IN

## 2023-11-07 DIAGNOSIS — Z02.89 HEALTH EXAMINATION OF DEFINED SUBPOPULATION: Primary | ICD-10-CM

## 2023-11-07 LAB
ALBUMIN SERPL BCG-MCNC: 4.6 G/DL (ref 3.5–5.2)
ALP SERPL-CCNC: 82 U/L (ref 40–129)
ALT SERPL W P-5'-P-CCNC: 27 U/L (ref 0–70)
ANION GAP SERPL CALCULATED.3IONS-SCNC: 9 MMOL/L (ref 7–15)
AST SERPL W P-5'-P-CCNC: 22 U/L (ref 0–45)
BILIRUB SERPL-MCNC: 0.3 MG/DL
BUN SERPL-MCNC: 20.4 MG/DL (ref 6–20)
CALCIUM SERPL-MCNC: 9.3 MG/DL (ref 8.6–10)
CHLORIDE SERPL-SCNC: 107 MMOL/L (ref 98–107)
CREAT SERPL-MCNC: 0.99 MG/DL (ref 0.67–1.17)
DEPRECATED HCO3 PLAS-SCNC: 25 MMOL/L (ref 22–29)
EGFRCR SERPLBLD CKD-EPI 2021: >90 ML/MIN/1.73M2
GLUCOSE SERPL-MCNC: 104 MG/DL (ref 70–99)
POTASSIUM SERPL-SCNC: 4.5 MMOL/L (ref 3.4–5.3)
PROT SERPL-MCNC: 7.5 G/DL (ref 6.4–8.3)
SODIUM SERPL-SCNC: 141 MMOL/L (ref 135–145)

## 2023-11-07 PROCEDURE — 99396 PREV VISIT EST AGE 40-64: CPT | Performed by: FAMILY MEDICINE

## 2023-11-07 PROCEDURE — 36415 COLL VENOUS BLD VENIPUNCTURE: CPT | Mod: ZL | Performed by: FAMILY MEDICINE

## 2023-11-07 PROCEDURE — 80053 COMPREHEN METABOLIC PANEL: CPT | Mod: ZL | Performed by: FAMILY MEDICINE

## 2023-11-07 ASSESSMENT — ENCOUNTER SYMPTOMS
DIZZINESS: 0
JOINT SWELLING: 0
SORE THROAT: 0
SHORTNESS OF BREATH: 0
MYALGIAS: 0
HEMATOCHEZIA: 0
CHILLS: 0
CONSTIPATION: 0
COUGH: 0
DIARRHEA: 0
HEMATURIA: 0
NERVOUS/ANXIOUS: 0
NAUSEA: 0
HEADACHES: 0
WEAKNESS: 0
FREQUENCY: 0
DYSURIA: 0
PALPITATIONS: 0
EYE PAIN: 0
HEARTBURN: 0
PARESTHESIAS: 0
ABDOMINAL PAIN: 0
FEVER: 0
ARTHRALGIAS: 0

## 2023-11-07 ASSESSMENT — PAIN SCALES - GENERAL: PAINLEVEL: NO PAIN (0)

## 2023-11-07 NOTE — NURSING NOTE
Patient here for yearly physical, last eye exam 5 years prior and last dental exam 2022. He does have a dry itchy spot on both ankles. Medication Reconciliation: complete.    Daisha Bryan LPN  11/7/2023 3:47 PM

## 2023-11-07 NOTE — PROGRESS NOTES
SUBJECTIVE:   Ismael Maier is a 47 year old male who presents to clinic today for the following health issues: Physical  The 10-year ASCVD risk score (Esme STINSON, et al., 2019) is: 2.5%    Values used to calculate the score:      Age: 47 years      Sex: Male      Is Non- : No      Diabetic: No      Tobacco smoker: No      Systolic Blood Pressure: 110 mmHg      Is BP treated: No      HDL Cholesterol: 34 mg/dL      Total Cholesterol: 176 mg/dL      Patient arrives here for physical.  His only complaint is itchiness involving his lower extremities.  Mainly around his ankle extending up a little towards his knee.  No rash.'s been going on for 2 years.    Healthy Habits:     Getting at least 3 servings of Calcium per day:  Yes    Bi-annual eye exam:  NO    Dental care twice a year:  NO    Sleep apnea or symptoms of sleep apnea:  Excessive snoring    Diet:  Regular (no restrictions)    Frequency of exercise:  2-3 days/week    Duration of exercise:  45-60 minutes    Taking medications regularly:  Not Applicable    Additional concerns today:  No            Review of Systems   Constitutional:  Negative for chills and fever.   HENT:  Negative for congestion, ear pain, hearing loss and sore throat.    Eyes:  Negative for pain and visual disturbance.   Respiratory:  Negative for cough and shortness of breath.    Cardiovascular:  Negative for chest pain, palpitations and peripheral edema.   Gastrointestinal:  Negative for abdominal pain, constipation, diarrhea, heartburn, hematochezia and nausea.   Genitourinary:  Negative for dysuria, frequency, genital sores, hematuria and urgency.   Musculoskeletal:  Negative for arthralgias, joint swelling and myalgias.   Skin:  Negative for rash.   Neurological:  Negative for dizziness, weakness, headaches and paresthesias.   Psychiatric/Behavioral:  Negative for mood changes. The patient is not nervous/anxious.         OBJECTIVE:     /64   Pulse 83    Temp 98.2  F (36.8  C)   Resp 20   Ht 1.829 m (6')   Wt 100.5 kg (221 lb 9.6 oz)   SpO2 97%   BMI 30.05 kg/m    Body mass index is 30.05 kg/m .  Physical Exam  Constitutional:       Appearance: Normal appearance.   HENT:      Head: Normocephalic and atraumatic.      Mouth/Throat:      Mouth: Mucous membranes are moist.   Cardiovascular:      Rate and Rhythm: Normal rate and regular rhythm.   Pulmonary:      Effort: Pulmonary effort is normal.      Breath sounds: Normal breath sounds.   Abdominal:      General: Abdomen is flat.   Skin:     Comments: Skin is slightly dry   Neurological:      Mental Status: He is alert.   Psychiatric:         Mood and Affect: Mood normal.         Diagnostic Test Results:  Results for orders placed or performed in visit on 11/07/23 (from the past 24 hour(s))   Comprehensive Metabolic Panel   Result Value Ref Range    Sodium 141 135 - 145 mmol/L    Potassium 4.5 3.4 - 5.3 mmol/L    Carbon Dioxide (CO2) 25 22 - 29 mmol/L    Anion Gap 9 7 - 15 mmol/L    Urea Nitrogen 20.4 (H) 6.0 - 20.0 mg/dL    Creatinine 0.99 0.67 - 1.17 mg/dL    GFR Estimate >90 >60 mL/min/1.73m2    Calcium 9.3 8.6 - 10.0 mg/dL    Chloride 107 98 - 107 mmol/L    Glucose 104 (H) 70 - 99 mg/dL    Alkaline Phosphatase 82 40 - 129 U/L    AST 22 0 - 45 U/L    ALT 27 0 - 70 U/L    Protein Total 7.5 6.4 - 8.3 g/dL    Albumin 4.6 3.5 - 5.2 g/dL    Bilirubin Total 0.3 <=1.2 mg/dL       ASSESSMENT/PLAN:         (Z02.89) Health examination of defined subpopulation  (primary encounter diagnosis)  Comment: Satisfactory.  Plan: Comprehensive Metabolic Panel  I did recommend Cortaid to his lower extremities along with Vanicream.  Follow-up if not improving    Also discussed his cardiac risk score Scott Mcqueen MD  [facility]

## 2023-11-08 ASSESSMENT — ENCOUNTER SYMPTOMS
EYE PAIN: 0
FEVER: 0
PARESTHESIAS: 0
COUGH: 0
DYSURIA: 0
ARTHRALGIAS: 0
PALPITATIONS: 0
FREQUENCY: 0
MYALGIAS: 0
SORE THROAT: 0
DIZZINESS: 0
WEAKNESS: 0
HEARTBURN: 0
JOINT SWELLING: 0
DIARRHEA: 0
HEMATOCHEZIA: 0
NERVOUS/ANXIOUS: 0
HEADACHES: 0
SHORTNESS OF BREATH: 0
CHILLS: 0
HEMATURIA: 0
CONSTIPATION: 0
ABDOMINAL PAIN: 0
NAUSEA: 0

## 2024-11-07 ENCOUNTER — OFFICE VISIT (OUTPATIENT)
Dept: FAMILY MEDICINE | Facility: OTHER | Age: 49
End: 2024-11-07
Attending: FAMILY MEDICINE
Payer: COMMERCIAL

## 2024-11-07 VITALS
SYSTOLIC BLOOD PRESSURE: 112 MMHG | WEIGHT: 216.6 LBS | RESPIRATION RATE: 20 BRPM | TEMPERATURE: 98.1 F | BODY MASS INDEX: 29.38 KG/M2 | DIASTOLIC BLOOD PRESSURE: 68 MMHG | HEART RATE: 66 BPM | OXYGEN SATURATION: 99 %

## 2024-11-07 DIAGNOSIS — Z00.00 ROUTINE GENERAL MEDICAL EXAMINATION AT A HEALTH CARE FACILITY: Primary | ICD-10-CM

## 2024-11-07 DIAGNOSIS — Z28.21 IMMUNIZATION DECLINED: ICD-10-CM

## 2024-11-07 DIAGNOSIS — R53.83 OTHER FATIGUE: ICD-10-CM

## 2024-11-07 DIAGNOSIS — R79.89 LOW TSH LEVEL: ICD-10-CM

## 2024-11-07 DIAGNOSIS — K42.9 UMBILICAL HERNIA WITHOUT OBSTRUCTION AND WITHOUT GANGRENE: ICD-10-CM

## 2024-11-07 LAB
ALBUMIN SERPL BCG-MCNC: 4.6 G/DL (ref 3.5–5.2)
ALP SERPL-CCNC: 83 U/L (ref 40–150)
ALT SERPL W P-5'-P-CCNC: 27 U/L (ref 0–70)
ANION GAP SERPL CALCULATED.3IONS-SCNC: 10 MMOL/L (ref 7–15)
AST SERPL W P-5'-P-CCNC: 19 U/L (ref 0–45)
BASOPHILS # BLD AUTO: 0.1 10E3/UL (ref 0–0.2)
BASOPHILS NFR BLD AUTO: 1 %
BILIRUB SERPL-MCNC: 0.4 MG/DL
BUN SERPL-MCNC: 23.1 MG/DL (ref 6–20)
CALCIUM SERPL-MCNC: 9.4 MG/DL (ref 8.8–10.4)
CHLORIDE SERPL-SCNC: 105 MMOL/L (ref 98–107)
CREAT SERPL-MCNC: 1.15 MG/DL (ref 0.67–1.17)
EGFRCR SERPLBLD CKD-EPI 2021: 79 ML/MIN/1.73M2
EOSINOPHIL # BLD AUTO: 0.3 10E3/UL (ref 0–0.7)
EOSINOPHIL NFR BLD AUTO: 3 %
ERYTHROCYTE [DISTWIDTH] IN BLOOD BY AUTOMATED COUNT: 12.4 % (ref 10–15)
GLUCOSE SERPL-MCNC: 94 MG/DL (ref 70–99)
HCO3 SERPL-SCNC: 25 MMOL/L (ref 22–29)
HCT VFR BLD AUTO: 45.5 % (ref 40–53)
HGB BLD-MCNC: 15.5 G/DL (ref 13.3–17.7)
IMM GRANULOCYTES # BLD: 0 10E3/UL
IMM GRANULOCYTES NFR BLD: 0 %
LYMPHOCYTES # BLD AUTO: 3 10E3/UL (ref 0.8–5.3)
LYMPHOCYTES NFR BLD AUTO: 31 %
MCH RBC QN AUTO: 29.6 PG (ref 26.5–33)
MCHC RBC AUTO-ENTMCNC: 34.1 G/DL (ref 31.5–36.5)
MCV RBC AUTO: 87 FL (ref 78–100)
MONOCYTES # BLD AUTO: 0.7 10E3/UL (ref 0–1.3)
MONOCYTES NFR BLD AUTO: 7 %
NEUTROPHILS # BLD AUTO: 5.7 10E3/UL (ref 1.6–8.3)
NEUTROPHILS NFR BLD AUTO: 59 %
NRBC # BLD AUTO: 0 10E3/UL
NRBC BLD AUTO-RTO: 0 /100
PLATELET # BLD AUTO: 310 10E3/UL (ref 150–450)
POTASSIUM SERPL-SCNC: 4.5 MMOL/L (ref 3.4–5.3)
PROT SERPL-MCNC: 7.6 G/DL (ref 6.4–8.3)
RBC # BLD AUTO: 5.23 10E6/UL (ref 4.4–5.9)
SODIUM SERPL-SCNC: 140 MMOL/L (ref 135–145)
T4 FREE SERPL-MCNC: 1.24 NG/DL (ref 0.9–1.7)
TSH SERPL DL<=0.005 MIU/L-ACNC: 0.2 UIU/ML (ref 0.3–4.2)
WBC # BLD AUTO: 9.8 10E3/UL (ref 4–11)

## 2024-11-07 PROCEDURE — 85004 AUTOMATED DIFF WBC COUNT: CPT | Mod: ZL | Performed by: FAMILY MEDICINE

## 2024-11-07 PROCEDURE — 36415 COLL VENOUS BLD VENIPUNCTURE: CPT | Mod: ZL | Performed by: FAMILY MEDICINE

## 2024-11-07 PROCEDURE — 84439 ASSAY OF FREE THYROXINE: CPT | Mod: ZL | Performed by: FAMILY MEDICINE

## 2024-11-07 PROCEDURE — 82947 ASSAY GLUCOSE BLOOD QUANT: CPT | Mod: ZL | Performed by: FAMILY MEDICINE

## 2024-11-07 PROCEDURE — 84155 ASSAY OF PROTEIN SERUM: CPT | Mod: ZL | Performed by: FAMILY MEDICINE

## 2024-11-07 PROCEDURE — 84443 ASSAY THYROID STIM HORMONE: CPT | Mod: ZL | Performed by: FAMILY MEDICINE

## 2024-11-07 SDOH — HEALTH STABILITY: PHYSICAL HEALTH: ON AVERAGE, HOW MANY MINUTES DO YOU ENGAGE IN EXERCISE AT THIS LEVEL?: 120 MIN

## 2024-11-07 SDOH — HEALTH STABILITY: PHYSICAL HEALTH: ON AVERAGE, HOW MANY DAYS PER WEEK DO YOU ENGAGE IN MODERATE TO STRENUOUS EXERCISE (LIKE A BRISK WALK)?: 4 DAYS

## 2024-11-07 ASSESSMENT — PAIN SCALES - GENERAL: PAINLEVEL_OUTOF10: NO PAIN (0)

## 2024-11-07 ASSESSMENT — SOCIAL DETERMINANTS OF HEALTH (SDOH): HOW OFTEN DO YOU GET TOGETHER WITH FRIENDS OR RELATIVES?: TWICE A WEEK

## 2024-11-07 NOTE — NURSING NOTE
Patient here for annual physical never had an eye exam and last dental exam 2024. Concerns with a possible hernia at the navel. Medication Reconciliation: complete.    Daisha Bryan LPN  11/7/2024 3:58 PM

## 2024-11-07 NOTE — PROGRESS NOTES
SUBJECTIVE:   Ismael Maier is a 48 year old male who presents to clinic today for the following health issues:  The 10-year ASCVD risk score (Esme STINSON, et al., 2019) is: 2.8%    Values used to calculate the score:      Age: 48 years      Sex: Male      Is Non- : No      Diabetic: No      Tobacco smoker: No      Systolic Blood Pressure: 112 mmHg      Is BP treated: No      HDL Cholesterol: 34 mg/dL      Total Cholesterol: 176 mg/dL      Patient arrives for physical.  He declines any vaccine.  He is concerned about a navel hernia.  He also reports being tired all the time but feels that that is probably just him.  He reports that on and off.  Not associated with any symptoms such as change in bowel or bladder.  Cough.  Or other complaints.  Has been going on for years          Patient Active Problem List    Diagnosis Date Noted    Umbilical hernia without obstruction and without gangrene 11/09/2024     Priority: Medium     No past medical history on file.   Past Surgical History:   Procedure Laterality Date    NO HISTORY OF SURGERY         Review of Systems     OBJECTIVE:     /68   Pulse 66   Temp 98.1  F (36.7  C)   Resp 20   Wt 98.2 kg (216 lb 9.6 oz)   SpO2 99%   BMI 29.38 kg/m    Body mass index is 29.38 kg/m .  Physical Exam  Constitutional:       Appearance: Normal appearance.   HENT:      Head: Normocephalic.      Mouth/Throat:      Mouth: Mucous membranes are moist.   Cardiovascular:      Rate and Rhythm: Normal rate and regular rhythm.   Pulmonary:      Effort: Pulmonary effort is normal.      Breath sounds: Normal breath sounds.   Abdominal:      General: Abdomen is flat.      Comments: Umbilical hernias present   Skin:     General: Skin is warm.   Neurological:      Mental Status: He is alert.   Psychiatric:         Mood and Affect: Mood normal.         Diagnostic Test Results:  Results for orders placed or performed in visit on 11/07/24   Comprehensive Metabolic  Panel     Status: Abnormal   Result Value Ref Range    Sodium 140 135 - 145 mmol/L    Potassium 4.5 3.4 - 5.3 mmol/L    Carbon Dioxide (CO2) 25 22 - 29 mmol/L    Anion Gap 10 7 - 15 mmol/L    Urea Nitrogen 23.1 (H) 6.0 - 20.0 mg/dL    Creatinine 1.15 0.67 - 1.17 mg/dL    GFR Estimate 79 >60 mL/min/1.73m2    Calcium 9.4 8.8 - 10.4 mg/dL    Chloride 105 98 - 107 mmol/L    Glucose 94 70 - 99 mg/dL    Alkaline Phosphatase 83 40 - 150 U/L    AST 19 0 - 45 U/L    ALT 27 0 - 70 U/L    Protein Total 7.6 6.4 - 8.3 g/dL    Albumin 4.6 3.5 - 5.2 g/dL    Bilirubin Total 0.4 <=1.2 mg/dL   TSH Reflex GH     Status: Abnormal   Result Value Ref Range    TSH 0.20 (L) 0.30 - 4.20 uIU/mL   CBC with platelets and differential     Status: None   Result Value Ref Range    WBC Count 9.8 4.0 - 11.0 10e3/uL    RBC Count 5.23 4.40 - 5.90 10e6/uL    Hemoglobin 15.5 13.3 - 17.7 g/dL    Hematocrit 45.5 40.0 - 53.0 %    MCV 87 78 - 100 fL    MCH 29.6 26.5 - 33.0 pg    MCHC 34.1 31.5 - 36.5 g/dL    RDW 12.4 10.0 - 15.0 %    Platelet Count 310 150 - 450 10e3/uL    % Neutrophils 59 %    % Lymphocytes 31 %    % Monocytes 7 %    % Eosinophils 3 %    % Basophils 1 %    % Immature Granulocytes 0 %    NRBCs per 100 WBC 0 <1 /100    Absolute Neutrophils 5.7 1.6 - 8.3 10e3/uL    Absolute Lymphocytes 3.0 0.8 - 5.3 10e3/uL    Absolute Monocytes 0.7 0.0 - 1.3 10e3/uL    Absolute Eosinophils 0.3 0.0 - 0.7 10e3/uL    Absolute Basophils 0.1 0.0 - 0.2 10e3/uL    Absolute Immature Granulocytes 0.0 <=0.4 10e3/uL    Absolute NRBCs 0.0 10e3/uL   T4 free     Status: Normal   Result Value Ref Range    Free T4 1.24 0.90 - 1.70 ng/dL   CBC and Differential     Status: None    Narrative    The following orders were created for panel order CBC and Differential.  Procedure                               Abnormality         Status                     ---------                               -----------         ------                     CBC with platelets and d...[[id number]]                       Final result                 Please view results for these tests on the individual orders.       ASSESSMENT/PLAN:         (Z00.00) Routine general medical examination at a health care facility  (primary encounter diagnosis)  Comment: Labs satisfactory except for TSH.  Plan: Will repeated.    (R53.83) Other fatigue  Comment:   Plan: CBC and Differential, Comprehensive Metabolic         Panel, TSH Reflex GH, T4 free        Lab satisfactory    (K42.9) Umbilical hernia without obstruction and without gangrene  Comment:   Plan: General surgery at patient's convenience    Low TSH.  Patient will be instructed to repeated in 2 to 3 weeks.  Scott Mcqueen MD  [facility]

## 2024-11-09 PROBLEM — Z28.21 IMMUNIZATION DECLINED: Status: ACTIVE | Noted: 2024-11-09

## 2024-11-09 PROBLEM — R79.89 LOW TSH LEVEL: Status: ACTIVE | Noted: 2024-11-09

## 2024-11-09 PROBLEM — K42.9 UMBILICAL HERNIA WITHOUT OBSTRUCTION AND WITHOUT GANGRENE: Status: ACTIVE | Noted: 2024-11-09

## 2024-12-11 ENCOUNTER — OFFICE VISIT (OUTPATIENT)
Dept: SURGERY | Facility: OTHER | Age: 49
End: 2024-12-11
Attending: SURGERY
Payer: COMMERCIAL

## 2024-12-11 VITALS
OXYGEN SATURATION: 100 % | TEMPERATURE: 97.2 F | WEIGHT: 222.8 LBS | BODY MASS INDEX: 30.22 KG/M2 | SYSTOLIC BLOOD PRESSURE: 120 MMHG | HEART RATE: 65 BPM | DIASTOLIC BLOOD PRESSURE: 88 MMHG | RESPIRATION RATE: 18 BRPM

## 2024-12-11 DIAGNOSIS — K42.9 UMBILICAL HERNIA WITHOUT OBSTRUCTION AND WITHOUT GANGRENE: Primary | ICD-10-CM

## 2024-12-11 RX ORDER — CEFAZOLIN SODIUM 2 G/100ML
2 INJECTION, SOLUTION INTRAVENOUS
OUTPATIENT
Start: 2024-12-11

## 2024-12-11 RX ORDER — ACETAMINOPHEN 325 MG/1
975 TABLET ORAL ONCE
OUTPATIENT
Start: 2024-12-11 | End: 2024-12-11

## 2024-12-11 RX ORDER — CEFAZOLIN SODIUM 2 G/100ML
2 INJECTION, SOLUTION INTRAVENOUS SEE ADMIN INSTRUCTIONS
OUTPATIENT
Start: 2024-12-11

## 2024-12-11 ASSESSMENT — PAIN SCALES - GENERAL: PAINLEVEL_OUTOF10: MILD PAIN (3)

## 2024-12-11 NOTE — NURSING NOTE
Patient is here for a consult for hernia. Has been about a year.    Dana Cruz LPN .............12/11/2024     2:26 PM

## 2024-12-11 NOTE — PROGRESS NOTES
Surgical Clinic Consult  Referring physician:  Scott Mcqueen   Primary physician:     Scott Mcqueen    Chief complaint:   Umbilical hernia    History of present illness:  This is a 49 year old male I am seeing in consultation for an umbilical hernia.  He has noticed this over the past year or 2.  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     Past medical history:   No past medical history on file.    Pastsurgical history:  Past Surgical History:   Procedure Laterality Date    NO HISTORY OF SURGERY         Current medications:  No current outpatient medications on file.       Allergies:  No Known Allergies    Family history:  No family history on file.    Social history:  Social History     Socioeconomic History    Marital status:      Spouse name: Not on file    Number of children: Not on file    Years of education: Not on file    Highest education level: Not on file   Occupational History    Not on file   Tobacco Use    Smoking status: Never     Passive exposure: Never    Smokeless tobacco: Current     Types: Chew   Vaping Use    Vaping status: Never Used   Substance and Sexual Activity    Alcohol use: No    Drug use: Never     Comment: Drug use: No    Sexual activity: Yes     Partners: Female     Birth control/protection: None   Other Topics Concern    Not on file   Social History Narrative    .  Two children.  Works as a  for [employer].     Social Drivers of Health     Financial Resource Strain: Low Risk  (11/7/2024)    Financial Resource Strain     Within the past 12 months, have you or your family members you live with been unable to get utilities (heat, electricity) when it was really needed?: No   Food Insecurity: Low Risk  (11/7/2024)    Food Insecurity     Within the past 12 months, did you worry that your food would run out before you got money to buy more?: No     Within the past 12  months, did the food you bought just not last and you didn t have money to get more?: No   Transportation Needs: Low Risk  (11/7/2024)    Transportation Needs     Within the past 12 months, has lack of transportation kept you from medical appointments, getting your medicines, non-medical meetings or appointments, work, or from getting things that you need?: No   Physical Activity: Sufficiently Active (11/7/2024)    Exercise Vital Sign     Days of Exercise per Week: 4 days     Minutes of Exercise per Session: 120 min   Stress: No Stress Concern Present (11/7/2024)    [place] [employer] - Occupational Stress Questionnaire     Feeling of Stress : Not at all   Social Connections: Unknown (11/7/2024)    Social Connection and Isolation Panel [NHANES]     Frequency of Communication with Friends and Family: Not on file     Frequency of Social Gatherings with Friends and Family: Twice a week     Attends Latter-day Services: Not on file     Active Member of Clubs or Organizations: Not on file     Attends Club or Organization Meetings: Not on file     Marital Status: Not on file   Interpersonal Safety: Low Risk  (12/11/2024)    Interpersonal Safety     Do you feel physically and emotionally safe where you currently live?: Yes     Within the past 12 months, have you been hit, slapped, kicked or otherwise physically hurt by someone?: No     Within the past 12 months, have you been humiliated or emotionally abused in other ways by your partner or ex-partner?: No   Housing Stability: Low Risk  (11/7/2024)    Housing Stability     Do you have housing? : Yes     Are you worried about losing your housing?: No       PROBLEM LIST:  Patient Active Problem List   Diagnosis    Umbilical hernia without obstruction and without gangrene    Low TSH level    Immunization declined       Review of Systems:  COMPLETE REVIEW OF SYSTEMS: Denies problems  Gastrointestinal: Denies problems negative Cologuard 2022  Cardiovascular:  Denies problems  Respiratory: Denies problems  Genitourinary: Denies problems  Musculoskeletal: Denies problems  Skin: Itching  Neurologic: Denies problems  Psychiatric: Denies problems  Endocrine: Denies problems  Heme/Lymphatic: Denies problems  Vascular: Denies problems    Physical exam: /88   Pulse 65   Temp 97.2  F (36.2  C) (Tympanic)   Resp 18   Wt 101.1 kg (222 lb 12.8 oz)   SpO2 100%   BMI 30.22 kg/m      General: this is a pleasant male patient in no acute distress.  Patient is awake alert and oriented x3 .   EXAM:  Chest/Respiratory Exam: Normal - Clear to auscultation without rales, rhonchi, or wheezing.  Cardiovascular Exam: regular rate and rhythm  Abdomen: No surgical scars.  Small bulge at the upper edge of the umbilicus, reducible.  Defect likely under 2 cm.     Assessment:   Umbilical hernia, symptomatic    Plan:    Umbilical hernia repair under general anesthesia as a day surgery.  Risks of bleeding, infection, recurrence, the possible use of prosthetic mesh discussed and wishes to proceed.  Just had a yearly physical in November.       Kosta Malone MD

## 2025-01-07 PROBLEM — K42.9 UMBILICAL HERNIA WITHOUT OBSTRUCTION AND WITHOUT GANGRENE: Status: ACTIVE | Noted: 2024-11-09

## 2025-01-13 ENCOUNTER — ANESTHESIA EVENT (OUTPATIENT)
Dept: SURGERY | Facility: OTHER | Age: 50
End: 2025-01-13
Payer: COMMERCIAL

## 2025-01-14 ENCOUNTER — ANESTHESIA (OUTPATIENT)
Dept: SURGERY | Facility: OTHER | Age: 50
End: 2025-01-14
Payer: COMMERCIAL

## 2025-01-14 ENCOUNTER — HOSPITAL ENCOUNTER (OUTPATIENT)
Facility: OTHER | Age: 50
Discharge: HOME | End: 2025-01-14
Attending: SURGERY | Admitting: SURGERY
Payer: COMMERCIAL

## 2025-01-14 VITALS
SYSTOLIC BLOOD PRESSURE: 119 MMHG | HEART RATE: 69 BPM | BODY MASS INDEX: 30.07 KG/M2 | DIASTOLIC BLOOD PRESSURE: 80 MMHG | RESPIRATION RATE: 16 BRPM | WEIGHT: 222 LBS | OXYGEN SATURATION: 97 % | HEIGHT: 72 IN | TEMPERATURE: 96.8 F

## 2025-01-14 DIAGNOSIS — K42.9 UMBILICAL HERNIA WITHOUT OBSTRUCTION AND WITHOUT GANGRENE: Primary | ICD-10-CM

## 2025-01-14 PROCEDURE — 250N000011 HC RX IP 250 OP 636: Performed by: NURSE ANESTHETIST, CERTIFIED REGISTERED

## 2025-01-14 PROCEDURE — 370N000017 HC ANESTHESIA TECHNICAL FEE, PER MIN: Performed by: SURGERY

## 2025-01-14 PROCEDURE — 250N000009 HC RX 250: Performed by: SURGERY

## 2025-01-14 PROCEDURE — 49591 RPR AA HRN 1ST < 3 CM RDC: CPT | Performed by: SURGERY

## 2025-01-14 PROCEDURE — 250N000025 HC SEVOFLURANE, PER MIN: Performed by: SURGERY

## 2025-01-14 PROCEDURE — 360N000075 HC SURGERY LEVEL 2, PER MIN: Performed by: SURGERY

## 2025-01-14 PROCEDURE — 250N000011 HC RX IP 250 OP 636: Performed by: SURGERY

## 2025-01-14 PROCEDURE — 999N000141 HC STATISTIC PRE-PROCEDURE NURSING ASSESSMENT: Performed by: SURGERY

## 2025-01-14 PROCEDURE — 710N000010 HC RECOVERY PHASE 1, LEVEL 2, PER MIN: Performed by: SURGERY

## 2025-01-14 PROCEDURE — 250N000009 HC RX 250: Performed by: NURSE ANESTHETIST, CERTIFIED REGISTERED

## 2025-01-14 PROCEDURE — 250N000013 HC RX MED GY IP 250 OP 250 PS 637: Performed by: SURGERY

## 2025-01-14 PROCEDURE — 272N000001 HC OR GENERAL SUPPLY STERILE: Performed by: SURGERY

## 2025-01-14 PROCEDURE — 258N000003 HC RX IP 258 OP 636: Performed by: NURSE ANESTHETIST, CERTIFIED REGISTERED

## 2025-01-14 PROCEDURE — 710N000012 HC RECOVERY PHASE 2, PER MINUTE: Performed by: SURGERY

## 2025-01-14 RX ORDER — CEFAZOLIN SODIUM/WATER 2 G/20 ML
2 SYRINGE (ML) INTRAVENOUS SEE ADMIN INSTRUCTIONS
Status: DISCONTINUED | OUTPATIENT
Start: 2025-01-14 | End: 2025-01-14 | Stop reason: HOSPADM

## 2025-01-14 RX ORDER — ACETAMINOPHEN 325 MG/1
975 TABLET ORAL ONCE
Status: COMPLETED | OUTPATIENT
Start: 2025-01-14 | End: 2025-01-14

## 2025-01-14 RX ORDER — DEXAMETHASONE SODIUM PHOSPHATE 10 MG/ML
4 INJECTION, SOLUTION INTRAMUSCULAR; INTRAVENOUS
Status: DISCONTINUED | OUTPATIENT
Start: 2025-01-14 | End: 2025-01-14 | Stop reason: HOSPADM

## 2025-01-14 RX ORDER — HYDROMORPHONE HCL IN WATER/PF 6 MG/30 ML
0.2 PATIENT CONTROLLED ANALGESIA SYRINGE INTRAVENOUS EVERY 5 MIN PRN
Status: DISCONTINUED | OUTPATIENT
Start: 2025-01-14 | End: 2025-01-14 | Stop reason: HOSPADM

## 2025-01-14 RX ORDER — NALOXONE HYDROCHLORIDE 0.4 MG/ML
0.1 INJECTION, SOLUTION INTRAMUSCULAR; INTRAVENOUS; SUBCUTANEOUS
Status: DISCONTINUED | OUTPATIENT
Start: 2025-01-14 | End: 2025-01-14 | Stop reason: HOSPADM

## 2025-01-14 RX ORDER — ONDANSETRON 2 MG/ML
4 INJECTION INTRAMUSCULAR; INTRAVENOUS EVERY 30 MIN PRN
Status: DISCONTINUED | OUTPATIENT
Start: 2025-01-14 | End: 2025-01-14 | Stop reason: HOSPADM

## 2025-01-14 RX ORDER — OXYCODONE AND ACETAMINOPHEN 5; 325 MG/1; MG/1
1 TABLET ORAL EVERY 6 HOURS PRN
Qty: 6 TABLET | Refills: 0 | Status: SHIPPED | OUTPATIENT
Start: 2025-01-14 | End: 2025-01-17

## 2025-01-14 RX ORDER — IBUPROFEN 600 MG/1
600 TABLET, FILM COATED ORAL 4 TIMES DAILY
Qty: 12 TABLET | Refills: 0 | Status: SHIPPED | OUTPATIENT
Start: 2025-01-14 | End: 2025-01-17

## 2025-01-14 RX ORDER — OXYCODONE HYDROCHLORIDE 5 MG/1
10 TABLET ORAL
Status: DISCONTINUED | OUTPATIENT
Start: 2025-01-14 | End: 2025-01-14 | Stop reason: HOSPADM

## 2025-01-14 RX ORDER — PROPOFOL 10 MG/ML
INJECTION, EMULSION INTRAVENOUS PRN
Status: DISCONTINUED | OUTPATIENT
Start: 2025-01-14 | End: 2025-01-14

## 2025-01-14 RX ORDER — FENTANYL CITRATE 50 UG/ML
50 INJECTION, SOLUTION INTRAMUSCULAR; INTRAVENOUS EVERY 5 MIN PRN
Status: DISCONTINUED | OUTPATIENT
Start: 2025-01-14 | End: 2025-01-14 | Stop reason: HOSPADM

## 2025-01-14 RX ORDER — CEFAZOLIN SODIUM/WATER 2 G/20 ML
2 SYRINGE (ML) INTRAVENOUS
Status: COMPLETED | OUTPATIENT
Start: 2025-01-14 | End: 2025-01-14

## 2025-01-14 RX ORDER — LIDOCAINE HYDROCHLORIDE 20 MG/ML
INJECTION, SOLUTION INFILTRATION; PERINEURAL PRN
Status: DISCONTINUED | OUTPATIENT
Start: 2025-01-14 | End: 2025-01-14

## 2025-01-14 RX ORDER — BUPIVACAINE HYDROCHLORIDE 2.5 MG/ML
INJECTION, SOLUTION INFILTRATION; PERINEURAL PRN
Status: DISCONTINUED | OUTPATIENT
Start: 2025-01-14 | End: 2025-01-14 | Stop reason: HOSPADM

## 2025-01-14 RX ORDER — SODIUM CHLORIDE, SODIUM LACTATE, POTASSIUM CHLORIDE, CALCIUM CHLORIDE 600; 310; 30; 20 MG/100ML; MG/100ML; MG/100ML; MG/100ML
INJECTION, SOLUTION INTRAVENOUS CONTINUOUS
Status: DISCONTINUED | OUTPATIENT
Start: 2025-01-14 | End: 2025-01-14 | Stop reason: HOSPADM

## 2025-01-14 RX ORDER — OXYCODONE HYDROCHLORIDE 5 MG/1
5 TABLET ORAL
Status: DISCONTINUED | OUTPATIENT
Start: 2025-01-14 | End: 2025-01-14 | Stop reason: HOSPADM

## 2025-01-14 RX ORDER — HYDROMORPHONE HCL IN WATER/PF 6 MG/30 ML
0.4 PATIENT CONTROLLED ANALGESIA SYRINGE INTRAVENOUS EVERY 5 MIN PRN
Status: DISCONTINUED | OUTPATIENT
Start: 2025-01-14 | End: 2025-01-14 | Stop reason: HOSPADM

## 2025-01-14 RX ORDER — ACETAMINOPHEN 325 MG/1
650 TABLET ORAL 4 TIMES DAILY
Qty: 24 TABLET | Refills: 0 | Status: SHIPPED | OUTPATIENT
Start: 2025-01-14 | End: 2025-01-17

## 2025-01-14 RX ORDER — ONDANSETRON 4 MG/1
4 TABLET, ORALLY DISINTEGRATING ORAL EVERY 30 MIN PRN
Status: DISCONTINUED | OUTPATIENT
Start: 2025-01-14 | End: 2025-01-14 | Stop reason: HOSPADM

## 2025-01-14 RX ORDER — LIDOCAINE 40 MG/G
CREAM TOPICAL
Status: DISCONTINUED | OUTPATIENT
Start: 2025-01-14 | End: 2025-01-14 | Stop reason: HOSPADM

## 2025-01-14 RX ORDER — FENTANYL CITRATE 50 UG/ML
INJECTION, SOLUTION INTRAMUSCULAR; INTRAVENOUS PRN
Status: DISCONTINUED | OUTPATIENT
Start: 2025-01-14 | End: 2025-01-14

## 2025-01-14 RX ORDER — ONDANSETRON 2 MG/ML
INJECTION INTRAMUSCULAR; INTRAVENOUS PRN
Status: DISCONTINUED | OUTPATIENT
Start: 2025-01-14 | End: 2025-01-14

## 2025-01-14 RX ORDER — DEXAMETHASONE SODIUM PHOSPHATE 4 MG/ML
INJECTION, SOLUTION INTRA-ARTICULAR; INTRALESIONAL; INTRAMUSCULAR; INTRAVENOUS; SOFT TISSUE PRN
Status: DISCONTINUED | OUTPATIENT
Start: 2025-01-14 | End: 2025-01-14

## 2025-01-14 RX ORDER — KETOROLAC TROMETHAMINE 30 MG/ML
INJECTION, SOLUTION INTRAMUSCULAR; INTRAVENOUS PRN
Status: DISCONTINUED | OUTPATIENT
Start: 2025-01-14 | End: 2025-01-14

## 2025-01-14 RX ORDER — FENTANYL CITRATE 50 UG/ML
25 INJECTION, SOLUTION INTRAMUSCULAR; INTRAVENOUS EVERY 5 MIN PRN
Status: DISCONTINUED | OUTPATIENT
Start: 2025-01-14 | End: 2025-01-14 | Stop reason: HOSPADM

## 2025-01-14 RX ADMIN — DEXAMETHASONE SODIUM PHOSPHATE 4 MG: 4 INJECTION, SOLUTION INTRA-ARTICULAR; INTRALESIONAL; INTRAMUSCULAR; INTRAVENOUS; SOFT TISSUE at 10:28

## 2025-01-14 RX ADMIN — ONDANSETRON HYDROCHLORIDE 4 MG: 2 SOLUTION INTRAMUSCULAR; INTRAVENOUS at 10:22

## 2025-01-14 RX ADMIN — FENTANYL CITRATE 50 MCG: 50 INJECTION INTRAMUSCULAR; INTRAVENOUS at 10:24

## 2025-01-14 RX ADMIN — FENTANYL CITRATE 50 MCG: 50 INJECTION INTRAMUSCULAR; INTRAVENOUS at 10:26

## 2025-01-14 RX ADMIN — CEFAZOLIN 2 G: 10 INJECTION, POWDER, FOR SOLUTION INTRAVENOUS at 10:09

## 2025-01-14 RX ADMIN — PROPOFOL 200 MG: 10 INJECTION, EMULSION INTRAVENOUS at 10:22

## 2025-01-14 RX ADMIN — SODIUM CHLORIDE, POTASSIUM CHLORIDE, SODIUM LACTATE AND CALCIUM CHLORIDE 100 ML/HR: 600; 310; 30; 20 INJECTION, SOLUTION INTRAVENOUS at 08:47

## 2025-01-14 RX ADMIN — MIDAZOLAM HYDROCHLORIDE 2 MG: 1 INJECTION, SOLUTION INTRAMUSCULAR; INTRAVENOUS at 10:20

## 2025-01-14 RX ADMIN — KETOROLAC TROMETHAMINE 30 MG: 30 INJECTION, SOLUTION INTRAMUSCULAR at 10:55

## 2025-01-14 RX ADMIN — LIDOCAINE HYDROCHLORIDE 60 MG: 20 INJECTION, SOLUTION INFILTRATION; PERINEURAL at 10:22

## 2025-01-14 RX ADMIN — ACETAMINOPHEN 975 MG: 325 TABLET, FILM COATED ORAL at 08:17

## 2025-01-14 RX ADMIN — SODIUM CHLORIDE, POTASSIUM CHLORIDE, SODIUM LACTATE AND CALCIUM CHLORIDE: 600; 310; 30; 20 INJECTION, SOLUTION INTRAVENOUS at 10:51

## 2025-01-14 ASSESSMENT — ACTIVITIES OF DAILY LIVING (ADL)
ADLS_ACUITY_SCORE: 41

## 2025-01-14 NOTE — OR NURSING
Patient was discharged home via ambulatory.   Discharge instructions were reviewed with patient and wife. And they verbalized understanding.   Prescriptions: eRX  Ice sent.  Pain 3/10, declined pain meds

## 2025-01-14 NOTE — ANESTHESIA PREPROCEDURE EVALUATION
Anesthesia Pre-Procedure Evaluation    Patient: Ismael Maier   MRN: [id number] : 1975        Procedure : Procedure(s):  HERNIORRHAPHY, UMBILICAL, OPEN          History reviewed. No pertinent past medical history.   Past Surgical History:   Procedure Laterality Date     NO HISTORY OF SURGERY        No Known Allergies   Social History     Tobacco Use     Smoking status: Never     Passive exposure: Never     Smokeless tobacco: Current     Types: Chew   Substance Use Topics     Alcohol use: No      Wt Readings from Last 1 Encounters:   25 100.7 kg (222 lb)        Anesthesia Evaluation   Pt has not had prior anesthetic         ROS/MED HX  ENT/Pulmonary: Comment: Chews tobacco      Neurologic:       Cardiovascular:       METS/Exercise Tolerance: >4 METS    Hematologic:       Musculoskeletal:       GI/Hepatic:       Renal/Genitourinary:       Endo:       Psychiatric/Substance Use:       Infectious Disease:       Malignancy:       Other:            Physical Exam    Airway  airway exam normal      Mallampati: II   TM distance: > 3 FB   Neck ROM: full   Mouth opening: > 3 cm    Respiratory Devices and Support         Dental       (+) Minor Abnormalities - some fillings, tiny chips      Cardiovascular   cardiovascular exam normal       Rhythm and rate: regular and normal     Pulmonary   pulmonary exam normal        breath sounds clear to auscultation       OUTSIDE LABS:  CBC:   Lab Results   Component Value Date    WBC 9.8 2024    HGB 15.5 2024    HCT 45.5 2024     2024     BMP:   Lab Results   Component Value Date     2024     2023    POTASSIUM 4.5 2024    POTASSIUM 4.5 2023    CHLORIDE 105 2024    CHLORIDE 107 2023    CO2 25 2024    CO2 25 2023    BUN 23.1 (H) 2024    BUN 20.4 (H) 2023    CR 1.15 2024    CR 0.99 2023    GLC 94 2024     (H) 2023     COAGS: No results found  "for: \"PTT\", \"INR\", \"FIBR\"  POC: No results found for: \"BGM\", \"HCG\", \"HCGS\"  HEPATIC:   Lab Results   Component Value Date    ALBUMIN 4.6 11/07/2024    PROTTOTAL 7.6 11/07/2024    ALT 27 11/07/2024    AST 19 11/07/2024    ALKPHOS 83 11/07/2024    BILITOTAL 0.4 11/07/2024     OTHER:   Lab Results   Component Value Date    A1C 5.4 09/30/2021    BRITNI 9.4 11/07/2024    TSH 0.20 (L) 11/07/2024    T4 1.24 11/07/2024       Anesthesia Plan    ASA Status:  2    NPO Status:  NPO Appropriate    Anesthesia Type: General.     - Airway: LMA   Induction: Intravenous.   Maintenance: Balanced.        Consents    Anesthesia Plan(s) and associated risks, benefits, and realistic alternatives discussed. Questions answered and patient/representative(s) expressed understanding.     - Discussed: Risks, Benefits and Alternatives for BOTH SEDATION and the PROCEDURE were discussed     - Discussed with:  Patient, Spouse      - Extended Intubation/Ventilatory Support Discussed: No.      - Patient is DNR/DNI Status: No     Use of blood products discussed: No .     Postoperative Care    Pain management: IV analgesics, Multi-modal analgesia.   PONV prophylaxis: Ondansetron (or other 5HT-3), Dexamethasone or Solumedrol     Comments:    Other Comments: Risks, benefits and alternatives discussed and would like to proceed.                JOHN Hunter CRNA    I have reviewed the pertinent notes and labs in the chart from the past 30 days and (re)examined the patient.  Any updates or changes from those notes are reflected in this note.                         # Obesity: Estimated body mass index is 30.11 kg/m  as calculated from the following:    Height as of this encounter: 1.829 m (6').    Weight as of this encounter: 100.7 kg (222 lb).             "

## 2025-01-14 NOTE — ANESTHESIA POSTPROCEDURE EVALUATION
Patient: Ismael Maier    Procedure: Procedure(s):  HERNIORRHAPHY, UMBILICAL, OPEN       Anesthesia Type:  General    Note:  Disposition: Outpatient   Postop Pain Control: Uneventful            Sign Out: Well controlled pain   PONV: No   Neuro/Psych: Uneventful            Sign Out: Acceptable/Baseline neuro status   Airway/Respiratory: Uneventful            Sign Out: Acceptable/Baseline resp. status   CV/Hemodynamics: Uneventful            Sign Out: Acceptable CV status; No obvious hypovolemia; No obvious fluid overload   Other NRE: NONE   DID A NON-ROUTINE EVENT OCCUR? No       Last vitals:  Vitals Value Taken Time   /97 01/14/25 1135   Temp 96.8  F (36  C) 01/14/25 1130   Pulse 60 01/14/25 1137   Resp 12 01/14/25 1137   SpO2 95 % 01/14/25 1137   Vitals shown include unfiled device data.    Electronically Signed By: JOHN Hunter CRNA  January 14, 2025  2:13 PM

## 2025-01-14 NOTE — LETTER
January 14, 2025      Ismael Maier  [address] [place] [address]        To Whom It May Concern:    Ismael Maier had surgery today. He may return to work on 1/20/25 with 20 pound lifting restriction for one month.      Sincerely,      Kosta Malone MD      Electronically signed

## 2025-01-14 NOTE — H&P
History and Physical    CHIEF COMPLAINT / REASON FOR PROCEDURE:  Umbilical hernia    PERTINENT HISTORY   Patient is a 49 year old male who presents today for umbilical hernia repair.    He has noticed this over the past year or 2.  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       History reviewed. No pertinent past medical history.  Past Surgical History:   Procedure Laterality Date    NO HISTORY OF SURGERY         Bleeding tendencies:  No    ALLERGIES/SENSITIVITIES: No Known Allergies     CURRENT MEDICATIONS:    Prior to Admission medications    Not on File       Physical Exam:  BP (!) 120/95   Pulse 74   Temp 97.4  F (36.3  C) (Tympanic)   Ht 1.829 m (6')   Wt 100.7 kg (222 lb)   SpO2 96%   BMI 30.11 kg/m    EXAM:  Chest/Respiratory Exam: Normal - Clear to auscultation without rales, rhonchi, or wheezing.  Cardiovascular Exam: regular rate and rhythm   Abdomen: No surgical scars.  Small bulge at the upper edge of the umbilicus, reducible.  Defect likely under 2 cm.      Assessment:   Umbilical hernia, symptomatic     Plan:    Umbilical hernia repair under general anesthesia as a day surgery.  Risks of bleeding, infection, recurrence, the possible use of prosthetic mesh discussed and wishes to proceed.

## 2025-01-14 NOTE — OP NOTE
PREOPERATIVE  DIAGNOSIS:  Umbilical hernia.    POSTOPERATIVE DIAGNOSIS:  Same.    PROCEDUREPERFORMED:  Umbilical herniorrhaphy .    ANESTHESIA:  General , J Ronenberg CRNA      SURGEON: Kosta Malone MD FACS    ASSISTANT:  DARIEL Vitale      INDICATION FOR THE PROCEDURE:  The patient is a 49 year old male who has a history of a bulge and tenderness at the umbilicus worse with strenuous activity.    On exam the patient has a small bulge at the upper edge of the umbilicus, reducible.    PROCEDURE:  After adequate anesthesia, the patient was prepped and draped in the usual sterile fashion.  Local anesthetic consisting of  0.25% Marcaine plain was used throughout.  After adequate local anesthesia, a supra-umbilical skin incision was made and carried downthrough the subcutaneous tissue and onto the umbilical hernia sac.  This was dissected off the undersurface of the umbilical skin which was also freed from the fascia.  The umbilical hernia sac was dissected free circumferentially from around the fascial edge, replaced into the preperitoneal space.    The maximum transverse diameter of the defect was 1.5 cm and so no mesh used.  The fascial defect was closed transversely in vest-over-pants fashion  with 0-Vicryl suture.  The umbilical skin was tacked back to the fascia with a 3-0 Vicryl suture.  The subcutaneous tissue was reapproximated with running 2-0 Vicryl suture, and the skin was closed with a 4-0 Monocryl intracuticular suture.  Proxi-Strips and clean, dry dressings wereapplied.  The patient was taken to the PACU in stable condition.    Kosta Malone MD,FACS

## 2025-01-14 NOTE — ANESTHESIA CARE TRANSFER NOTE
Patient: Ismael Maier    Procedure: Procedure(s):  HERNIORRHAPHY, UMBILICAL, OPEN       Diagnosis: Umbilical hernia without obstruction and without gangrene [K42.9]  Diagnosis Additional Information: No value filed.    Anesthesia Type:   General     Note:    Oropharynx: oropharynx clear of all foreign objects  Level of Consciousness: awake  Oxygen Supplementation: face mask  Level of Supplemental Oxygen (L/min / FiO2): 8  Independent Airway: airway patency satisfactory and stable  Dentition: dentition unchanged  Vital Signs Stable: post-procedure vital signs reviewed and stable  Report to RN Given: handoff report given  Patient transferred to: PACU    Handoff Report: Identifed the Patient, Identified the Reponsible Provider, Reviewed the pertinent medical history, Discussed the surgical course, Reviewed Intra-OP anesthesia mangement and issues during anesthesia, Set expectations for post-procedure period and Allowed opportunity for questions and acknowledgement of understanding      Vitals:  Vitals Value Taken Time   BP     Temp     Pulse     Resp     SpO2         Electronically Signed By: JOHN MANZANO CRNA  January 14, 2025  11:15 AM

## 2025-01-14 NOTE — ANESTHESIA PROCEDURE NOTES
Airway       Patient location during procedure: OR       Procedure Start/Stop Times: 1/14/2025 10:23 AM  Staff -        CRNA: Beth Aly APRN CRNA       Performed By: CRNA  Consent for Airway        Urgency: elective  Indications and Patient Condition       Indications for airway management: flori-procedural       Induction type:intravenous       Mask difficulty assessment: 0 - not attempted    Final Airway Details       Final airway type: supraglottic airway    Supraglottic Airway Details        Type: LMA       Brand: I-Gel       LMA size: 4    Post intubation assessment        Placement verified by: capnometry, equal breath sounds and chest rise        Number of attempts at approach: 1       Number of other approaches attempted: 0       Secured with: tape       Ease of procedure: easy       Dentition: Intact and Unchanged    Medication(s) Administered   Medication Administration Time: 1/14/2025 10:23 AM

## 2025-01-22 ENCOUNTER — OFFICE VISIT (OUTPATIENT)
Dept: SURGERY | Facility: OTHER | Age: 50
End: 2025-01-22
Attending: SURGERY
Payer: COMMERCIAL

## 2025-01-22 VITALS
RESPIRATION RATE: 18 BRPM | WEIGHT: 224 LBS | DIASTOLIC BLOOD PRESSURE: 82 MMHG | OXYGEN SATURATION: 99 % | HEART RATE: 70 BPM | SYSTOLIC BLOOD PRESSURE: 128 MMHG | TEMPERATURE: 98.6 F | BODY MASS INDEX: 30.38 KG/M2

## 2025-01-22 DIAGNOSIS — Z98.890 POSTOPERATIVE STATE: Primary | ICD-10-CM

## 2025-01-22 PROBLEM — K42.9 UMBILICAL HERNIA WITHOUT OBSTRUCTION AND WITHOUT GANGRENE: Status: RESOLVED | Noted: 2024-11-09 | Resolved: 2025-01-22

## 2025-01-22 ASSESSMENT — PAIN SCALES - GENERAL: PAINLEVEL_OUTOF10: NO PAIN (0)

## 2025-01-22 NOTE — NURSING NOTE
Chief Complaint   Patient presents with    Post-Op - General Surgery     1-14-25   umbilical hernia repair       Medication reconciliation completed.    FOOD SECURITY SCREENING QUESTIONS:    The next two questions are to help us understand your food security.  If you are feeling you need any assistance in this area, we have resources available to support you today.    Hunger Vital Signs:  Within the past 12 months we worried whether our food would run out before we got money to buy more. Never  Within the past 12 months the food we bought just didn't last and we didn't have money to get more. Never    Initial /82 (BP Location: Right arm, Patient Position: Sitting, Cuff Size: Adult Regular)   Pulse 70   Temp 98.6  F (37  C) (Tympanic)   Resp 18   Wt 101.6 kg (224 lb)   SpO2 99%   BMI 30.38 kg/m   Estimated body mass index is 30.38 kg/m  as calculated from the following:    Height as of 1/14/25: 1.829 m (6').    Weight as of this encounter: 101.6 kg (224 lb).       Danielle Muse LPN .......  1/22/2025  1:36 PM

## 2025-01-22 NOTE — PROGRESS NOTES
Subjective:  This is a follow up after umbilical hernia repair on 1/14/25. The patient has no complaints.     Objective:/82 (BP Location: Right arm, Patient Position: Sitting, Cuff Size: Adult Regular)   Pulse 70   Temp 98.6  F (37  C) (Tympanic)   Resp 18   Wt 101.6 kg (224 lb)   SpO2 99%   BMI 30.38 kg/m    The incision is healing well without erythema. Some ecchymosis. No bulging.    Assessment:   Doing well after umbilical hernia repair    Plan:  Follow-up at one month and keep lifting under 20 pounds until then

## 2025-04-09 ENCOUNTER — OFFICE VISIT (OUTPATIENT)
Dept: FAMILY MEDICINE | Facility: OTHER | Age: 50
End: 2025-04-09
Attending: FAMILY MEDICINE
Payer: COMMERCIAL

## 2025-04-09 VITALS
HEART RATE: 74 BPM | RESPIRATION RATE: 16 BRPM | TEMPERATURE: 98.2 F | OXYGEN SATURATION: 99 % | DIASTOLIC BLOOD PRESSURE: 80 MMHG | SYSTOLIC BLOOD PRESSURE: 130 MMHG | BODY MASS INDEX: 30.83 KG/M2 | WEIGHT: 227.6 LBS | HEIGHT: 72 IN

## 2025-04-09 DIAGNOSIS — Z72.0 CHEWING TOBACCO USE: ICD-10-CM

## 2025-04-09 DIAGNOSIS — R94.6 THYROID FUNCTION TEST ABNORMAL: ICD-10-CM

## 2025-04-09 DIAGNOSIS — Z00.00 ADULT GENERAL MEDICAL EXAM: Primary | ICD-10-CM

## 2025-04-09 LAB
T4 FREE SERPL-MCNC: 1.24 NG/DL (ref 0.9–1.7)
TSH SERPL DL<=0.005 MIU/L-ACNC: 0.18 UIU/ML (ref 0.3–4.2)

## 2025-04-09 PROCEDURE — 84439 ASSAY OF FREE THYROXINE: CPT | Mod: ZL | Performed by: FAMILY MEDICINE

## 2025-04-09 PROCEDURE — 84443 ASSAY THYROID STIM HORMONE: CPT | Mod: ZL | Performed by: FAMILY MEDICINE

## 2025-04-09 PROCEDURE — 36415 COLL VENOUS BLD VENIPUNCTURE: CPT | Mod: ZL | Performed by: FAMILY MEDICINE

## 2025-04-09 SDOH — HEALTH STABILITY: PHYSICAL HEALTH: ON AVERAGE, HOW MANY MINUTES DO YOU ENGAGE IN EXERCISE AT THIS LEVEL?: 120 MIN

## 2025-04-09 SDOH — HEALTH STABILITY: PHYSICAL HEALTH: ON AVERAGE, HOW MANY DAYS PER WEEK DO YOU ENGAGE IN MODERATE TO STRENUOUS EXERCISE (LIKE A BRISK WALK)?: 3 DAYS

## 2025-04-09 ASSESSMENT — SOCIAL DETERMINANTS OF HEALTH (SDOH): HOW OFTEN DO YOU GET TOGETHER WITH FRIENDS OR RELATIVES?: TWICE A WEEK

## 2025-04-09 ASSESSMENT — PAIN SCALES - GENERAL: PAINLEVEL_OUTOF10: NO PAIN (0)

## 2025-04-09 NOTE — NURSING NOTE
Chief Complaint   Patient presents with    Physical     Patient presents to the clinic for his annual physical. He denies any concerns at this time.       Initial /80 (BP Location: Right arm, Patient Position: Sitting, Cuff Size: Adult Large)   Pulse 74   Temp 98.2  F (36.8  C) (Tympanic)   Resp 16   Ht 1.829 m (6')   Wt 103.2 kg (227 lb 9.6 oz)   SpO2 99%   BMI 30.87 kg/m   Estimated body mass index is 30.87 kg/m  as calculated from the following:    Height as of this encounter: 1.829 m (6').    Weight as of this encounter: 103.2 kg (227 lb 9.6 oz).  Medication Review: complete    The next two questions are to help us understand your food security.  If you are feeling you need any assistance in this area, we have resources available to support you today.          4/9/2025   SDOH- Food Insecurity   Within the past 12 months, did you worry that your food would run out before you got money to buy more? N   Within the past 12 months, did the food you bought just not last and you didn t have money to get more? N         Health Care Directive:  Patient does not have a Health Care Directive: Discussed advance care planning with patient; however, patient declined at this time.    Britney oJnes LPN

## 2025-04-09 NOTE — PROGRESS NOTES
Preventive Care Visit  [facility]  David Duenas MD, Family Medicine  Apr 9, 2025      Assessment & Plan     Adult general medical exam  Healthy 49-year-old male.  Preventative care discussed.  Lipid panel has been normal in 2021 so hold off on that.  Normal CMP and CBC November 2024 so those are not repeated.  Cologuard would be due at the end of this year.  He is due for tetanus and that is recommended, but he will hold off on that for now and consider that for the near future.    Chewing tobacco use  Recommended chewing tobacco cessation.  Offered help if he needs it at some point.    Thyroid function test abnormal  Mildly low TSH with the blood work in November.  Recheck TSH with reflex.  I will notify him of those results.  - TSH Reflex [facility]; Future  - TSH Reflex [facility]            BMI  Estimated body mass index is 30.87 kg/m  as calculated from the following:    Height as of this encounter: 1.829 m (6').    Weight as of this encounter: 103.2 kg (227 lb 9.6 oz).       Counseling  Appropriate preventive services were addressed with this patient via screening, questionnaire, or discussion as appropriate for fall prevention, nutrition, physical activity, Tobacco-use cessation, social engagement, weight loss and cognition.  Checklist reviewing preventive services available has been given to the patient.  Reviewed patient's diet, addressing concerns and/or questions.   He is at risk for lack of exercise and has been provided with information to increase physical activity for the benefit of his well-being.   The patient was instructed to see the dentist every 6 months.           No follow-ups on file.    Subjective   James is a 49 year old, presenting for the following:  Physical        4/9/2025     1:00 PM   Additional Questions   Roomed by Britney PEREZ  49-year-old male here for annual preventative physical examination.  He overall is doing well and really has no active complaints or  concerns.  He wanted to move his physical to earlier in the year to avoid deer season in November.  He takes no medications.  He still does chew tobacco half can per day.  He works for [employer] as a  for the last 28 years.  No other complaints today.         Advance Care Planning  Patient does not have a Health Care Directive:       4/9/2025   General Health   How would you rate your overall physical health? Good   Feel stress (tense, anxious, or unable to sleep) Not at all         4/9/2025   Nutrition   Three or more servings of calcium each day? Yes   Diet: Regular (no restrictions)   How many servings of fruit and vegetables per day? (!) 2-3   How many sweetened beverages each day? (!) 4+         4/9/2025   Exercise   Days per week of moderate/strenous exercise 3 days   Average minutes spent exercising at this level 120 min         4/9/2025   Social Factors   Frequency of gathering with friends or relatives Twice a week   Worry food won't last until get money to buy more No   Food not last or not have enough money for food? No   Do you have housing? (Housing is defined as stable permanent housing and does not include staying ouside in a car, in a tent, in an abandoned building, in an overnight shelter, or couch-surfing.) Yes   Are you worried about losing your housing? No   Lack of transportation? No   Unable to get utilities (heat,electricity)? No         4/9/2025   Dental   Dentist two times every year? (!) NO           11/7/2024   TB Screening   Were you born outside of the [place]? No             Today's PHQ-2 Score:       1/22/2025     1:18 PM   PHQ-2 ( 1999 [employer])   Q1: Little interest or pleasure in doing things 0   Q2: Feeling down, depressed or hopeless 0   PHQ-2 Score 0    Q1: Little interest or pleasure in doing things Not at all   Q2: Feeling down, depressed or hopeless Not at all   PHQ-2 Score 0       Patient-reported         4/9/2025   Substance Use   Alcohol more than 3/day or more than 7/wk No   Do  you use any other substances recreationally? No     Social History     Tobacco Use    Smoking status: Never     Passive exposure: Never    Smokeless tobacco: Current     Types: Chew   Vaping Use    Vaping status: Never Used   Substance Use Topics    Alcohol use: No    Drug use: Never     Comment: Drug use: No           4/9/2025   STI Screening   New sexual partner(s) since last STI/HIV test? No   ASCVD Risk   The 10-year ASCVD risk score (Esme STINSON, et al., 2019) is: 4.1%    Values used to calculate the score:      Age: 49 years      Sex: Male      Is Non- : No      Diabetic: No      Tobacco smoker: No      Systolic Blood Pressure: 130 mmHg      Is BP treated: No      HDL Cholesterol: 34 mg/dL      Total Cholesterol: 176 mg/dL        4/9/2025   Contraception/Family Planning   Questions about contraception or family planning No        Reviewed and updated as needed this visit by Provider   Tobacco  Allergies  Meds  Problems  Med Hx  Surg Hx  Fam Hx            Past Medical History:   Diagnosis Date    Chewing tobacco use 04/09/2025    Umbilical hernia without obstruction and without gangrene 11/09/2024     Past Surgical History:   Procedure Laterality Date    HERNIORRHAPHY UMBILICAL N/A 01/14/2025    Procedure: HERNIORRHAPHY, UMBILICAL, OPEN;  Surgeon: Kosta Malone MD;  Location: [facility] OR            Objective    Exam  /80 (BP Location: Right arm, Patient Position: Sitting, Cuff Size: Adult Large)   Pulse 74   Temp 98.2  F (36.8  C) (Tympanic)   Resp 16   Ht 1.829 m (6')   Wt 103.2 kg (227 lb 9.6 oz)   SpO2 99%   BMI 30.87 kg/m     Estimated body mass index is 30.87 kg/m  as calculated from the following:    Height as of this encounter: 1.829 m (6').    Weight as of this encounter: 103.2 kg (227 lb 9.6 oz).    Physical Exam  GENERAL: alert and no distress  EYES: Eyes grossly normal to inspection, PERRL and conjunctivae and sclerae normal  HENT: ear canals and TM's  normal, nose and mouth without ulcers or lesions  NECK: no adenopathy, no asymmetry, masses, or scars  RESP: lungs clear to auscultation - no rales, rhonchi or wheezes  CV: regular rate and rhythm, normal S1 S2, no S3 or S4, no murmur, click or rub, no peripheral edema  ABDOMEN: soft, nontender, no hepatosplenomegaly, no masses and bowel sounds normal  MS: no gross musculoskeletal defects noted, no edema  SKIN: no suspicious lesions or rashes  NEURO: Normal strength and tone, mentation intact and speech normal  PSYCH: mentation appears normal, affect normal/bright        Signed Electronically by: David Duenas MD

## 2025-05-14 ENCOUNTER — RESULTS FOLLOW-UP (OUTPATIENT)
Dept: FAMILY MEDICINE | Facility: OTHER | Age: 50
End: 2025-05-14

## 2025-05-14 ENCOUNTER — HOSPITAL ENCOUNTER (OUTPATIENT)
Dept: ULTRASOUND IMAGING | Facility: OTHER | Age: 50
Discharge: HOME | End: 2025-05-14
Attending: FAMILY MEDICINE
Payer: COMMERCIAL

## 2025-05-14 DIAGNOSIS — R94.6 THYROID FUNCTION TEST ABNORMAL: ICD-10-CM

## 2025-05-14 PROCEDURE — 76536 US EXAM OF HEAD AND NECK: CPT

## 2025-05-14 PROCEDURE — 76536 US EXAM OF HEAD AND NECK: CPT | Mod: 26 | Performed by: RADIOLOGY

## (undated) DEVICE — SU VICRYL 3-0 CT-3 27" J327H

## (undated) DEVICE — SU VICRYL 3-0 SH 27" UND J416H

## (undated) DEVICE — SPONGE COTTON BALL PURE

## (undated) DEVICE — SU VICRYL 2-0 CT 36" J957H

## (undated) DEVICE — SOL WATER 1500ML

## (undated) DEVICE — PACK MAJOR LAPAROTOMY LF SBA15MLFCA

## (undated) DEVICE — SU MONOCRYL 4-0 PS-2 27" UND Y426H

## (undated) DEVICE — DRSG STERI STRIP 1/2X4" R1547

## (undated) DEVICE — PREP CHLORAPREP CLEAR 26ML APPLICATOR 260800

## (undated) DEVICE — BLADE KNIFE SURG 15 SAFETY 373915

## (undated) DEVICE — GLOVE PROTEXIS POWDER FREE SMT 8.0  2D72PT80X

## (undated) DEVICE — GLOVE BIOGEL PI INDICATOR 8.0 LF 41680

## (undated) DEVICE — SU VICRYL 0 CT-2 CR 8X18" J727D

## (undated) DEVICE — ESU GROUND PAD ADULT W/CORD E7507

## (undated) DEVICE — DRSG GAUZE 4X4" 3033

## (undated) DEVICE — DRSG MEDIPORE 3 1/2X4" 3566

## (undated) DEVICE — PENCIL MEGADYNE TELESCOPING SMOKE EVACUATION 10 FT 251010J

## (undated) DEVICE — SLEEVE COMPRESSION SCD KNEE MED 74022

## (undated) RX ORDER — BUPIVACAINE HYDROCHLORIDE 2.5 MG/ML
INJECTION, SOLUTION EPIDURAL; INFILTRATION; INTRACAUDAL
Status: DISPENSED
Start: 2025-01-14

## (undated) RX ORDER — ACETAMINOPHEN 325 MG/1
TABLET ORAL
Status: DISPENSED
Start: 2025-01-14

## (undated) RX ORDER — CEFAZOLIN SODIUM/WATER 2 G/20 ML
SYRINGE (ML) INTRAVENOUS
Status: DISPENSED
Start: 2025-01-14

## (undated) RX ORDER — KETOROLAC TROMETHAMINE 30 MG/ML
INJECTION, SOLUTION INTRAMUSCULAR; INTRAVENOUS
Status: DISPENSED
Start: 2025-01-14

## (undated) RX ORDER — DEXAMETHASONE SODIUM PHOSPHATE 4 MG/ML
INJECTION, SOLUTION INTRA-ARTICULAR; INTRALESIONAL; INTRAMUSCULAR; INTRAVENOUS; SOFT TISSUE
Status: DISPENSED
Start: 2025-01-14

## (undated) RX ORDER — FENTANYL CITRATE 50 UG/ML
INJECTION, SOLUTION INTRAMUSCULAR; INTRAVENOUS
Status: DISPENSED
Start: 2025-01-14

## (undated) RX ORDER — PROPOFOL 10 MG/ML
INJECTION, EMULSION INTRAVENOUS
Status: DISPENSED
Start: 2025-01-14

## (undated) RX ORDER — ONDANSETRON 2 MG/ML
INJECTION INTRAMUSCULAR; INTRAVENOUS
Status: DISPENSED
Start: 2025-01-14